# Patient Record
Sex: MALE | Race: WHITE | NOT HISPANIC OR LATINO | Employment: OTHER | URBAN - METROPOLITAN AREA
[De-identification: names, ages, dates, MRNs, and addresses within clinical notes are randomized per-mention and may not be internally consistent; named-entity substitution may affect disease eponyms.]

---

## 2017-01-05 ENCOUNTER — OFFICE VISIT (OUTPATIENT)
Dept: INTERNAL MEDICINE | Facility: CLINIC | Age: 82
End: 2017-01-05

## 2017-01-05 VITALS
HEART RATE: 68 BPM | WEIGHT: 143.8 LBS | HEIGHT: 68 IN | SYSTOLIC BLOOD PRESSURE: 120 MMHG | BODY MASS INDEX: 21.79 KG/M2 | DIASTOLIC BLOOD PRESSURE: 60 MMHG

## 2017-01-05 DIAGNOSIS — D69.6 THROMBOCYTOPENIA (HCC): ICD-10-CM

## 2017-01-05 DIAGNOSIS — R32 URINARY INCONTINENCE, UNSPECIFIED TYPE: ICD-10-CM

## 2017-01-05 DIAGNOSIS — I48.19 PERSISTENT ATRIAL FIBRILLATION (HCC): ICD-10-CM

## 2017-01-05 DIAGNOSIS — E03.8 OTHER SPECIFIED HYPOTHYROIDISM: ICD-10-CM

## 2017-01-05 DIAGNOSIS — E55.9 VITAMIN D DEFICIENCY: ICD-10-CM

## 2017-01-05 DIAGNOSIS — M50.90 DISC DISORDER OF CERVICAL REGION: ICD-10-CM

## 2017-01-05 DIAGNOSIS — I10 ESSENTIAL HYPERTENSION: ICD-10-CM

## 2017-01-05 DIAGNOSIS — E53.9 VITAMIN B DEFICIENCY: ICD-10-CM

## 2017-01-05 DIAGNOSIS — M51.36 DISC DEGENERATION, LUMBAR: ICD-10-CM

## 2017-01-05 DIAGNOSIS — E78.49 OTHER HYPERLIPIDEMIA: ICD-10-CM

## 2017-01-05 DIAGNOSIS — I05.0 MITRAL VALVE STENOSIS, UNSPECIFIED ETIOLOGY: ICD-10-CM

## 2017-01-05 DIAGNOSIS — I35.1 AORTIC VALVE INSUFFICIENCY, UNSPECIFIED ETIOLOGY: Primary | ICD-10-CM

## 2017-01-05 PROCEDURE — 99214 OFFICE O/P EST MOD 30 MIN: CPT | Performed by: INTERNAL MEDICINE

## 2017-01-05 RX ORDER — PREDNISONE 10 MG/1
TABLET ORAL SEE ADMIN INSTRUCTIONS
Qty: 1 EACH | Refills: 0 | Status: SHIPPED | OUTPATIENT
Start: 2017-01-05 | End: 2017-02-16

## 2017-01-05 NOTE — PROGRESS NOTES
Patient is a 89 y.o. male who is here for back pain for about 3 weeks.  Chief Complaint   Patient presents with   • Back Pain         HPI:  Here for f/u.  Patient c/o low to mid back pain.  Onset about 2 weeks.  Worst with getting in and out of bed.  No radiation into legs.  Using tylenol with little help.  Deep breathe worsens.  No fever or chills.  No dysuria.      History:    Patient Active Problem List   Diagnosis   • Aortic regurgitation   • Ataxic gait   • Atherosclerotic heart disease of native coronary artery without angina pectoris   • Atrial fibrillation   • Cor pulmonale   • Disc degeneration, lumbar   • Disc disorder of cervical region   • Hyperlipidemia   • Hypertension   • Hypothyroidism   • Mitral regurgitation   • Mitral stenosis   • Sleep apnea   • Supraventricular aortic stenosis   • Urinary incontinence   • Vitamin D deficiency   • Vitamin B deficiency   • Thrombocytopenia   • Stroke   • Malignant neoplasm prostate   • Cerebral infarction       Past Medical History   Diagnosis Date   • Acute frontal sinusitis    • Cardiac disorder    • Hyperlipidemia    • Hypertension    • Malignant neoplasm prostate    • Stroke      Cerebral infarction, unspecified   • Thrombocytopenia    • Thyroid disease    • Vitamin B deficiency        Past Surgical History   Procedure Laterality Date   • Appendectomy     • Cholecystectomy     • Prostate surgery  1990     sec to prostate ca   • Hernia repair       x3       Current Outpatient Prescriptions on File Prior to Visit   Medication Sig   • amiodarone (PACERONE) 200 MG tablet Take 200 mg by mouth daily.   • apixaban (ELIQUIS) 5 MG tablet tablet Take 1 tablet by mouth 2 (two) times a day.   • Ascorbic Acid (VITAMIN C PO) Take 1 tablet by mouth daily.   • atorvastatin (LIPITOR) 40 MG tablet Take 40 mg by mouth every night.   • Cholecalciferol (VITAMIN D-3 PO) Take  by mouth. Take 1-2 tablets QD   • Coenzyme Q10 (CO Q-10) 100 MG capsule Take 1 capsule by mouth daily.   •  Cyanocobalamin (VITAMIN B 12) 100 MCG lozenge Take 1 lozenge by mouth.   • digoxin (LANOXIN) 125 MCG tablet Take 125 mcg by mouth daily.   • diltiazem CD (CARDIZEM CD) 240 MG 24 hr capsule Take  by mouth.   • furosemide (LASIX) 40 MG tablet Take 1 tablet by mouth 2 (Two) Times a Day.   • levothyroxine (SYNTHROID, LEVOTHROID) 125 MCG tablet Take 1 tablet by mouth Daily.   • metoprolol tartrate (LOPRESSOR) 25 MG tablet Take 25 mg by mouth 2 (two) times a day.   • Omega-3 Fatty Acids (FISH OIL) 1000 MG capsule capsule Take 1 capsule by mouth daily.   • tamsulosin (FLOMAX) 0.4 MG capsule 24 hr capsule Take 1 capsule by mouth Daily.     No current facility-administered medications on file prior to visit.        Family History   Problem Relation Age of Onset   • Diabetes Mother    • Cancer Father    • Cancer Brother        Social History     Social History   • Marital status:      Spouse name: N/A   • Number of children: N/A   • Years of education: N/A     Occupational History   • Not on file.     Social History Main Topics   • Smoking status: Former Smoker   • Smokeless tobacco: Not on file      Comment: distant past   • Alcohol use Yes   • Drug use: Not on file   • Sexual activity: Not on file     Other Topics Concern   • Not on file     Social History Narrative         ROS:    Review of Systems   Constitutional: Positive for fatigue. Negative for chills, diaphoresis, fever and unexpected weight change.   HENT: Negative for congestion, ear pain, hearing loss, nosebleeds, postnasal drip, sinus pressure and sore throat.    Eyes: Negative for pain, discharge and itching.   Respiratory: Positive for shortness of breath. Negative for cough, chest tightness and wheezing.    Cardiovascular: Negative for chest pain, palpitations and leg swelling.   Gastrointestinal: Negative for abdominal distention, abdominal pain, blood in stool, constipation, diarrhea, nausea and vomiting.   Endocrine: Negative for polydipsia and  "polyuria.   Genitourinary: Positive for frequency. Negative for difficulty urinating, dysuria and hematuria.   Musculoskeletal: Positive for arthralgias, back pain and gait problem. Negative for joint swelling and myalgias.   Skin: Negative for rash and wound.   Neurological: Negative for dizziness, syncope, weakness and headaches.   Psychiatric/Behavioral: Negative for dysphoric mood and sleep disturbance. The patient is not nervous/anxious.        Visit Vitals   • /60   • Pulse 68   • Ht 68\" (172.7 cm)   • Wt 143 lb 12.8 oz (65.2 kg)   • BMI 21.86 kg/m2       Physical Exam:    Physical Exam   Constitutional: He is oriented to person, place, and time. He appears well-developed and well-nourished.   HENT:   Head: Normocephalic and atraumatic.   Right Ear: External ear normal.   Left Ear: External ear normal.   Mouth/Throat: Oropharynx is clear and moist.   Eyes: Conjunctivae and EOM are normal.   Neck: Normal range of motion. Neck supple.   Cardiovascular: Normal rate and regular rhythm.    Murmur (2/6 braydon) heard.  Pulmonary/Chest: Effort normal.   Bibasilar rales   Abdominal: Soft. Bowel sounds are normal.   Musculoskeletal: He exhibits edema (trace).   Using rolling walker  Motor in UE 3-4/5  Motor in LE  3-4/5   Lymphadenopathy:     He has no cervical adenopathy.   Neurological: He is alert and oriented to person, place, and time.   Skin: Skin is warm and dry.   Right cheek AK times one   Psychiatric: He has a normal mood and affect. His behavior is normal. Thought content normal.       Procedure:      Discussion/Summary:  htn/AS/AR/CAD-cont current tx  afib-cont eliquis and rate control  edema-cont lasix but decrease to qd and to extra 20 mgi if wt goes up by 4 pounds  hypothyroid-cont replacement, recheck tsh on rtc  fatigue-labs today on rtc  hyperlipidemia-cont lipitor  high risk meds-labs today on rtc  gait insability-PT continuation  cva-cont eliqius due to PAF  Back pain-steroid pac      labs noted " and dw patient      Current Outpatient Prescriptions:   •  amiodarone (PACERONE) 200 MG tablet, Take 200 mg by mouth daily., Disp: , Rfl:   •  apixaban (ELIQUIS) 5 MG tablet tablet, Take 1 tablet by mouth 2 (two) times a day., Disp: , Rfl:   •  Ascorbic Acid (VITAMIN C PO), Take 1 tablet by mouth daily., Disp: , Rfl:   •  atorvastatin (LIPITOR) 40 MG tablet, Take 40 mg by mouth every night., Disp: , Rfl:   •  Cholecalciferol (VITAMIN D-3 PO), Take  by mouth. Take 1-2 tablets QD, Disp: , Rfl:   •  Coenzyme Q10 (CO Q-10) 100 MG capsule, Take 1 capsule by mouth daily., Disp: , Rfl:   •  Cyanocobalamin (VITAMIN B 12) 100 MCG lozenge, Take 1 lozenge by mouth., Disp: , Rfl:   •  digoxin (LANOXIN) 125 MCG tablet, Take 125 mcg by mouth daily., Disp: , Rfl:   •  diltiazem CD (CARDIZEM CD) 240 MG 24 hr capsule, Take  by mouth., Disp: , Rfl:   •  furosemide (LASIX) 40 MG tablet, Take 1 tablet by mouth 2 (Two) Times a Day., Disp: 180 tablet, Rfl: 2  •  levothyroxine (SYNTHROID, LEVOTHROID) 125 MCG tablet, Take 1 tablet by mouth Daily., Disp: 90 tablet, Rfl: 1  •  metoprolol tartrate (LOPRESSOR) 25 MG tablet, Take 25 mg by mouth 2 (two) times a day., Disp: , Rfl:   •  Omega-3 Fatty Acids (FISH OIL) 1000 MG capsule capsule, Take 1 capsule by mouth daily., Disp: , Rfl:   •  tamsulosin (FLOMAX) 0.4 MG capsule 24 hr capsule, Take 1 capsule by mouth Daily., Disp: 90 capsule, Rfl: 2  •  PredniSONE (DELTASONE) 10 MG (48) tablet pack, Take  by mouth See Admin Instructions., Disp: 1 each, Rfl: 0        Ephraim was seen today for back pain.    Diagnoses and all orders for this visit:    Aortic valve insufficiency, unspecified etiology    Persistent atrial fibrillation    Essential hypertension    Mitral valve stenosis, unspecified etiology    Vitamin B deficiency    Vitamin D deficiency    Other specified hypothyroidism    Disc degeneration, lumbar  -     PredniSONE (DELTASONE) 10 MG (48) tablet pack; Take  by mouth See Admin  Instructions.    Disc disorder of cervical region  -     PredniSONE (DELTASONE) 10 MG (48) tablet pack; Take  by mouth See Admin Instructions.    Thrombocytopenia    Other hyperlipidemia    Urinary incontinence, unspecified type

## 2017-02-16 ENCOUNTER — OFFICE VISIT (OUTPATIENT)
Dept: INTERNAL MEDICINE | Facility: CLINIC | Age: 82
End: 2017-02-16

## 2017-02-16 VITALS
DIASTOLIC BLOOD PRESSURE: 50 MMHG | BODY MASS INDEX: 21.52 KG/M2 | HEART RATE: 68 BPM | HEIGHT: 68 IN | SYSTOLIC BLOOD PRESSURE: 115 MMHG | WEIGHT: 142 LBS

## 2017-02-16 DIAGNOSIS — I27.81 COR PULMONALE (HCC): ICD-10-CM

## 2017-02-16 DIAGNOSIS — E55.9 VITAMIN D DEFICIENCY: ICD-10-CM

## 2017-02-16 DIAGNOSIS — R26.0 ATAXIC GAIT: ICD-10-CM

## 2017-02-16 DIAGNOSIS — M50.90 DISC DISORDER OF CERVICAL REGION: ICD-10-CM

## 2017-02-16 DIAGNOSIS — I48.19 PERSISTENT ATRIAL FIBRILLATION (HCC): ICD-10-CM

## 2017-02-16 DIAGNOSIS — E53.9 VITAMIN B DEFICIENCY: ICD-10-CM

## 2017-02-16 DIAGNOSIS — I05.0 MITRAL VALVE STENOSIS, UNSPECIFIED ETIOLOGY: ICD-10-CM

## 2017-02-16 DIAGNOSIS — E78.49 OTHER HYPERLIPIDEMIA: ICD-10-CM

## 2017-02-16 DIAGNOSIS — E03.8 OTHER SPECIFIED HYPOTHYROIDISM: ICD-10-CM

## 2017-02-16 DIAGNOSIS — I35.1 AORTIC VALVE INSUFFICIENCY, UNSPECIFIED ETIOLOGY: Primary | ICD-10-CM

## 2017-02-16 DIAGNOSIS — D69.6 THROMBOCYTOPENIA (HCC): ICD-10-CM

## 2017-02-16 DIAGNOSIS — I34.0 MITRAL VALVE INSUFFICIENCY, UNSPECIFIED ETIOLOGY: ICD-10-CM

## 2017-02-16 DIAGNOSIS — I25.10 ATHEROSCLEROSIS OF NATIVE CORONARY ARTERY OF NATIVE HEART WITHOUT ANGINA PECTORIS: ICD-10-CM

## 2017-02-16 DIAGNOSIS — M51.36 DISC DEGENERATION, LUMBAR: ICD-10-CM

## 2017-02-16 DIAGNOSIS — I10 ESSENTIAL HYPERTENSION: ICD-10-CM

## 2017-02-16 DIAGNOSIS — R32 URINARY INCONTINENCE, UNSPECIFIED TYPE: ICD-10-CM

## 2017-02-16 LAB — TSH SERPL DL<=0.05 MIU/L-ACNC: 0.61 MIU/ML (ref 0.35–5.35)

## 2017-02-16 PROCEDURE — 36415 COLL VENOUS BLD VENIPUNCTURE: CPT | Performed by: INTERNAL MEDICINE

## 2017-02-16 PROCEDURE — 84443 ASSAY THYROID STIM HORMONE: CPT | Performed by: INTERNAL MEDICINE

## 2017-02-16 PROCEDURE — 99214 OFFICE O/P EST MOD 30 MIN: CPT | Performed by: INTERNAL MEDICINE

## 2017-02-16 NOTE — PROGRESS NOTES
Patient is a 89 y.o. male who is here for a follow up of chronic conditions.  Chief Complaint   Patient presents with   • Hypertension   • Hyperlipidemia         HPI:  Here for f/u.  Back pain is better.  Had Mitral valvuloplasty.  Plans for aortic TVAR.  Breathing is good.  No edema.  Appetite is some better.  Sleep is good.  Energy level is not the best.     History:    Patient Active Problem List   Diagnosis   • Aortic regurgitation   • Ataxic gait   • Atherosclerotic heart disease of native coronary artery without angina pectoris   • Atrial fibrillation   • Cor pulmonale   • Disc degeneration, lumbar   • Disc disorder of cervical region   • Hyperlipidemia   • Hypertension   • Hypothyroidism   • Mitral regurgitation   • Mitral stenosis   • Sleep apnea   • Supraventricular aortic stenosis   • Urinary incontinence   • Vitamin D deficiency   • Vitamin B deficiency   • Thrombocytopenia   • Stroke   • Malignant neoplasm prostate   • Cerebral infarction       Past Medical History   Diagnosis Date   • Acute frontal sinusitis    • Cardiac disorder    • Hyperlipidemia    • Hypertension    • Malignant neoplasm prostate    • Stroke      Cerebral infarction, unspecified   • Thrombocytopenia    • Thyroid disease    • Vitamin B deficiency        Past Surgical History   Procedure Laterality Date   • Appendectomy     • Cholecystectomy     • Prostate surgery  1990     sec to prostate ca   • Hernia repair       x3   • Mitral valvuloplasty  01/2017     [facility]       Current Outpatient Prescriptions on File Prior to Visit   Medication Sig   • amiodarone (PACERONE) 200 MG tablet Take 200 mg by mouth daily.   • apixaban (ELIQUIS) 5 MG tablet tablet Take 1 tablet by mouth 2 (two) times a day.   • Ascorbic Acid (VITAMIN C PO) Take 1 tablet by mouth daily.   • atorvastatin (LIPITOR) 40 MG tablet Take 40 mg by mouth every night.   • Cholecalciferol (VITAMIN D-3 PO) Take  by mouth. Take 1-2 tablets QD   • Coenzyme Q10 (CO Q-10) 100 MG  capsule Take 1 capsule by mouth daily.   • Cyanocobalamin (VITAMIN B 12) 100 MCG lozenge Take 1 lozenge by mouth.   • digoxin (LANOXIN) 125 MCG tablet Take 125 mcg by mouth daily.   • diltiazem CD (CARDIZEM CD) 240 MG 24 hr capsule Take  by mouth.   • furosemide (LASIX) 40 MG tablet Take 1 tablet by mouth 2 (Two) Times a Day.   • levothyroxine (SYNTHROID, LEVOTHROID) 125 MCG tablet Take 1 tablet by mouth Daily.   • metoprolol tartrate (LOPRESSOR) 25 MG tablet Take 25 mg by mouth 2 (two) times a day.   • Omega-3 Fatty Acids (FISH OIL) 1000 MG capsule capsule Take 1 capsule by mouth daily.   • tamsulosin (FLOMAX) 0.4 MG capsule 24 hr capsule Take 1 capsule by mouth Daily.   • [DISCONTINUED] PredniSONE (DELTASONE) 10 MG (48) tablet pack Take  by mouth See Admin Instructions.     No current facility-administered medications on file prior to visit.        Family History   Problem Relation Age of Onset   • Diabetes Mother    • Cancer Father    • Cancer Brother        Social History     Social History   • Marital status:      Spouse name: N/A   • Number of children: N/A   • Years of education: N/A     Occupational History   • Not on file.     Social History Main Topics   • Smoking status: Former Smoker   • Smokeless tobacco: Not on file      Comment: distant past   • Alcohol use Yes   • Drug use: Not on file   • Sexual activity: Not on file     Other Topics Concern   • Not on file     Social History Narrative         ROS:    Review of Systems   Constitutional: Positive for fatigue. Negative for chills, diaphoresis, fever and unexpected weight change.   HENT: Negative for congestion, ear pain, hearing loss, nosebleeds, postnasal drip, sinus pressure and sore throat.    Eyes: Negative for pain, discharge and itching.   Respiratory: Positive for shortness of breath. Negative for cough, chest tightness and wheezing.    Cardiovascular: Negative for chest pain, palpitations and leg swelling.   Gastrointestinal: Negative  "for abdominal distention, abdominal pain, blood in stool, constipation, diarrhea, nausea and vomiting.   Endocrine: Negative for polydipsia and polyuria.   Genitourinary: Positive for frequency. Negative for difficulty urinating, dysuria and hematuria.   Musculoskeletal: Positive for arthralgias and gait problem. Negative for back pain, joint swelling and myalgias.   Skin: Negative for rash and wound.   Neurological: Negative for dizziness, syncope, weakness and headaches.   Psychiatric/Behavioral: Negative for dysphoric mood and sleep disturbance. The patient is not nervous/anxious.        Visit Vitals   • /50   • Pulse 68   • Ht 68\" (172.7 cm)   • Wt 142 lb (64.4 kg)   • BMI 21.59 kg/m2       Physical Exam:    Physical Exam   Constitutional: He is oriented to person, place, and time. He appears well-developed and well-nourished.   HENT:   Head: Normocephalic and atraumatic.   Right Ear: External ear normal.   Left Ear: External ear normal.   Mouth/Throat: Oropharynx is clear and moist.   Eyes: Conjunctivae and EOM are normal.   Neck: Normal range of motion. Neck supple.   Cardiovascular: Normal rate and regular rhythm.    Murmur (2/6 braydon) heard.  Pulmonary/Chest: Effort normal.   Bibasilar rales, but overall improved   Abdominal: Soft. Bowel sounds are normal.   Musculoskeletal: He exhibits edema (trace).   Using rolling walker  Motor in UE 3-4/5  Motor in LE  3-4/5   Lymphadenopathy:     He has no cervical adenopathy.   Neurological: He is alert and oriented to person, place, and time.   Skin: Skin is warm and dry.   Psychiatric: He has a normal mood and affect. His behavior is normal. Thought content normal.       Procedure:      Discussion/Summary:  htn/AS/AR/CAD-cont current tx  afib-cont eliquis and rate control  edema-cont lasix but decrease to qd and to extra 20 mgi if wt goes up by 4 pounds  hypothyroid-cont replacement, recheck tsh today  fatigue-labs noted  hyperlipidemia-cont lipitor  high risk " meds-labs today on rtc  gait insability-PT continuation  cva-cont eliqius due to PAF  Back pain-overall improved      labs noted and dw patient      Current Outpatient Prescriptions:   •  amiodarone (PACERONE) 200 MG tablet, Take 200 mg by mouth daily., Disp: , Rfl:   •  apixaban (ELIQUIS) 5 MG tablet tablet, Take 1 tablet by mouth 2 (two) times a day., Disp: , Rfl:   •  Ascorbic Acid (VITAMIN C PO), Take 1 tablet by mouth daily., Disp: , Rfl:   •  atorvastatin (LIPITOR) 40 MG tablet, Take 40 mg by mouth every night., Disp: , Rfl:   •  Cholecalciferol (VITAMIN D-3 PO), Take  by mouth. Take 1-2 tablets QD, Disp: , Rfl:   •  Coenzyme Q10 (CO Q-10) 100 MG capsule, Take 1 capsule by mouth daily., Disp: , Rfl:   •  Cyanocobalamin (VITAMIN B 12) 100 MCG lozenge, Take 1 lozenge by mouth., Disp: , Rfl:   •  digoxin (LANOXIN) 125 MCG tablet, Take 125 mcg by mouth daily., Disp: , Rfl:   •  diltiazem CD (CARDIZEM CD) 240 MG 24 hr capsule, Take  by mouth., Disp: , Rfl:   •  furosemide (LASIX) 40 MG tablet, Take 1 tablet by mouth 2 (Two) Times a Day., Disp: 180 tablet, Rfl: 2  •  levothyroxine (SYNTHROID, LEVOTHROID) 125 MCG tablet, Take 1 tablet by mouth Daily., Disp: 90 tablet, Rfl: 1  •  metoprolol tartrate (LOPRESSOR) 25 MG tablet, Take 25 mg by mouth 2 (two) times a day., Disp: , Rfl:   •  Omega-3 Fatty Acids (FISH OIL) 1000 MG capsule capsule, Take 1 capsule by mouth daily., Disp: , Rfl:   •  tamsulosin (FLOMAX) 0.4 MG capsule 24 hr capsule, Take 1 capsule by mouth Daily., Disp: 90 capsule, Rfl: 2        Ephraim was seen today for hypertension and hyperlipidemia.    Diagnoses and all orders for this visit:    Aortic valve insufficiency, unspecified etiology    Atherosclerosis of native coronary artery of native heart without angina pectoris    Persistent atrial fibrillation    Cor pulmonale    Other hyperlipidemia    Essential hypertension    Mitral valve insufficiency, unspecified etiology    Mitral valve stenosis,  unspecified etiology    Vitamin B deficiency    Vitamin D deficiency    Other specified hypothyroidism  -     TSH    Disc degeneration, lumbar    Disc disorder of cervical region    Thrombocytopenia    Ataxic gait    Urinary incontinence, unspecified type

## 2017-04-03 DIAGNOSIS — E03.8 OTHER SPECIFIED HYPOTHYROIDISM: ICD-10-CM

## 2017-04-03 RX ORDER — LEVOTHYROXINE SODIUM 0.12 MG/1
125 TABLET ORAL DAILY
Qty: 90 TABLET | Refills: 1 | Status: SHIPPED | OUTPATIENT
Start: 2017-04-03 | End: 2017-05-18

## 2017-05-17 DIAGNOSIS — E03.8 OTHER SPECIFIED HYPOTHYROIDISM: ICD-10-CM

## 2017-05-17 RX ORDER — LEVOTHYROXINE SODIUM 0.12 MG/1
TABLET ORAL
Qty: 90 TABLET | Refills: 0 | Status: SHIPPED | OUTPATIENT
Start: 2017-05-17 | End: 2018-01-05 | Stop reason: SDUPTHER

## 2017-05-18 ENCOUNTER — OFFICE VISIT (OUTPATIENT)
Dept: INTERNAL MEDICINE | Facility: CLINIC | Age: 82
End: 2017-05-18

## 2017-05-18 VITALS
BODY MASS INDEX: 20.61 KG/M2 | DIASTOLIC BLOOD PRESSURE: 40 MMHG | HEART RATE: 68 BPM | WEIGHT: 136 LBS | SYSTOLIC BLOOD PRESSURE: 105 MMHG | HEIGHT: 68 IN

## 2017-05-18 DIAGNOSIS — E55.9 VITAMIN D DEFICIENCY: ICD-10-CM

## 2017-05-18 DIAGNOSIS — E03.8 OTHER SPECIFIED HYPOTHYROIDISM: ICD-10-CM

## 2017-05-18 DIAGNOSIS — I25.10 ATHEROSCLEROSIS OF NATIVE CORONARY ARTERY OF NATIVE HEART WITHOUT ANGINA PECTORIS: Primary | ICD-10-CM

## 2017-05-18 DIAGNOSIS — E78.49 OTHER HYPERLIPIDEMIA: ICD-10-CM

## 2017-05-18 DIAGNOSIS — I48.19 PERSISTENT ATRIAL FIBRILLATION (HCC): ICD-10-CM

## 2017-05-18 DIAGNOSIS — I10 ESSENTIAL HYPERTENSION: ICD-10-CM

## 2017-05-18 DIAGNOSIS — D64.9 ANEMIA, UNSPECIFIED TYPE: ICD-10-CM

## 2017-05-18 DIAGNOSIS — D69.6 THROMBOCYTOPENIA (HCC): ICD-10-CM

## 2017-05-18 DIAGNOSIS — R26.0 ATAXIC GAIT: ICD-10-CM

## 2017-05-18 DIAGNOSIS — E53.9 VITAMIN B DEFICIENCY: ICD-10-CM

## 2017-05-18 LAB
25(OH)D3 SERPL-MCNC: 35.6 NG/ML
ALBUMIN SERPL-MCNC: 3.6 G/DL (ref 3.2–4.8)
ALBUMIN/GLOB SERPL: 1 G/DL (ref 1.5–2.5)
ALP SERPL-CCNC: 118 U/L (ref 25–100)
ALT SERPL W P-5'-P-CCNC: 17 U/L (ref 7–40)
ANION GAP SERPL CALCULATED.3IONS-SCNC: 5 MMOL/L (ref 3–11)
AST SERPL-CCNC: 24 U/L (ref 0–33)
BILIRUB SERPL-MCNC: 0.5 MG/DL (ref 0.3–1.2)
BUN BLD-MCNC: 26 MG/DL (ref 9–23)
BUN/CREAT SERPL: 21.7 (ref 7–25)
CALCIUM SPEC-SCNC: 9.3 MG/DL (ref 8.7–10.4)
CHLORIDE SERPL-SCNC: 102 MMOL/L (ref 99–109)
CO2 SERPL-SCNC: 28 MMOL/L (ref 20–31)
CREAT BLD-MCNC: 1.2 MG/DL (ref 0.6–1.3)
DEPRECATED RDW RBC AUTO: 54.5 FL (ref 37–54)
ERYTHROCYTE [DISTWIDTH] IN BLOOD BY AUTOMATED COUNT: 16 % (ref 11.3–14.5)
GFR SERPL CREATININE-BSD FRML MDRD: 57 ML/MIN/1.73
GLOBULIN UR ELPH-MCNC: 3.6 GM/DL
GLUCOSE BLD-MCNC: 100 MG/DL (ref 70–100)
HCT VFR BLD AUTO: 29.6 % (ref 38.9–50.9)
HGB BLD-MCNC: 8.8 G/DL (ref 13.1–17.5)
MCH RBC QN AUTO: 27.4 PG (ref 27–31)
MCHC RBC AUTO-ENTMCNC: 29.7 G/DL (ref 32–36)
MCV RBC AUTO: 92.2 FL (ref 80–99)
PLATELET # BLD AUTO: 407 10*3/MM3 (ref 150–450)
PMV BLD AUTO: 9.8 FL (ref 6–12)
POTASSIUM BLD-SCNC: 4.1 MMOL/L (ref 3.5–5.5)
PROT SERPL-MCNC: 7.2 G/DL (ref 5.7–8.2)
RBC # BLD AUTO: 3.21 10*6/MM3 (ref 4.2–5.76)
SODIUM BLD-SCNC: 135 MMOL/L (ref 132–146)
TSH SERPL DL<=0.05 MIU/L-ACNC: 0.99 MIU/ML (ref 0.35–5.35)
VIT B12 BLD-MCNC: >2000 PG/ML (ref 211–911)
WBC NRBC COR # BLD: 9.94 10*3/MM3 (ref 3.5–10.8)

## 2017-05-18 PROCEDURE — 82746 ASSAY OF FOLIC ACID SERUM: CPT | Performed by: INTERNAL MEDICINE

## 2017-05-18 PROCEDURE — 84443 ASSAY THYROID STIM HORMONE: CPT | Performed by: INTERNAL MEDICINE

## 2017-05-18 PROCEDURE — 82306 VITAMIN D 25 HYDROXY: CPT | Performed by: INTERNAL MEDICINE

## 2017-05-18 PROCEDURE — 82607 VITAMIN B-12: CPT | Performed by: INTERNAL MEDICINE

## 2017-05-18 PROCEDURE — 99214 OFFICE O/P EST MOD 30 MIN: CPT | Performed by: INTERNAL MEDICINE

## 2017-05-18 PROCEDURE — 80053 COMPREHEN METABOLIC PANEL: CPT | Performed by: INTERNAL MEDICINE

## 2017-05-18 PROCEDURE — 83540 ASSAY OF IRON: CPT | Performed by: INTERNAL MEDICINE

## 2017-05-18 PROCEDURE — 85027 COMPLETE CBC AUTOMATED: CPT | Performed by: INTERNAL MEDICINE

## 2017-05-18 RX ORDER — CLOPIDOGREL BISULFATE 75 MG/1
TABLET ORAL DAILY
COMMUNITY
Start: 2017-05-01 | End: 2017-11-27

## 2017-05-18 RX ORDER — VIT A/VIT C/VIT E/ZINC/COPPER 7160-113
TABLET, DELAYED RELEASE (ENTERIC COATED) ORAL
COMMUNITY

## 2017-05-19 LAB
FOLATE SERPL-MCNC: >24 NG/ML (ref 3.2–20)
IRON 24H UR-MRATE: 21 MCG/DL (ref 50–175)

## 2017-05-19 RX ORDER — FERROUS SULFATE 325(65) MG
325 TABLET ORAL 2 TIMES DAILY
Qty: 60 TABLET | Refills: 2 | Status: SHIPPED | OUTPATIENT
Start: 2017-05-19 | End: 2017-08-14 | Stop reason: SDUPTHER

## 2017-06-01 RX ORDER — FUROSEMIDE 40 MG/1
40 TABLET ORAL 2 TIMES DAILY
Qty: 180 TABLET | Refills: 2 | Status: SHIPPED | OUTPATIENT
Start: 2017-06-01 | End: 2017-06-09

## 2017-06-09 RX ORDER — FUROSEMIDE 20 MG/1
20 TABLET ORAL 2 TIMES DAILY
Qty: 60 TABLET | Refills: 5 | Status: SHIPPED | OUTPATIENT
Start: 2017-06-09 | End: 2017-08-10 | Stop reason: SDUPTHER

## 2017-06-27 ENCOUNTER — OFFICE VISIT (OUTPATIENT)
Dept: INTERNAL MEDICINE | Facility: CLINIC | Age: 82
End: 2017-06-27

## 2017-06-27 VITALS
WEIGHT: 138 LBS | DIASTOLIC BLOOD PRESSURE: 40 MMHG | BODY MASS INDEX: 20.92 KG/M2 | SYSTOLIC BLOOD PRESSURE: 115 MMHG | HEIGHT: 68 IN | HEART RATE: 68 BPM

## 2017-06-27 DIAGNOSIS — I27.81 COR PULMONALE (HCC): ICD-10-CM

## 2017-06-27 DIAGNOSIS — I10 ESSENTIAL HYPERTENSION: ICD-10-CM

## 2017-06-27 DIAGNOSIS — E53.9 VITAMIN B DEFICIENCY: ICD-10-CM

## 2017-06-27 DIAGNOSIS — D64.9 ANEMIA, UNSPECIFIED TYPE: ICD-10-CM

## 2017-06-27 DIAGNOSIS — D69.6 THROMBOCYTOPENIA (HCC): ICD-10-CM

## 2017-06-27 DIAGNOSIS — E78.49 OTHER HYPERLIPIDEMIA: ICD-10-CM

## 2017-06-27 DIAGNOSIS — I48.19 PERSISTENT ATRIAL FIBRILLATION (HCC): ICD-10-CM

## 2017-06-27 DIAGNOSIS — I25.10 ATHEROSCLEROSIS OF NATIVE CORONARY ARTERY OF NATIVE HEART WITHOUT ANGINA PECTORIS: ICD-10-CM

## 2017-06-27 DIAGNOSIS — I35.1 AORTIC VALVE INSUFFICIENCY, UNSPECIFIED ETIOLOGY: Primary | ICD-10-CM

## 2017-06-27 DIAGNOSIS — E03.8 OTHER SPECIFIED HYPOTHYROIDISM: ICD-10-CM

## 2017-06-27 DIAGNOSIS — R26.0 ATAXIC GAIT: ICD-10-CM

## 2017-06-27 DIAGNOSIS — E55.9 VITAMIN D DEFICIENCY: ICD-10-CM

## 2017-06-27 LAB
ANION GAP SERPL CALCULATED.3IONS-SCNC: 8 MMOL/L (ref 3–11)
BUN BLD-MCNC: 25 MG/DL (ref 9–23)
BUN/CREAT SERPL: 20.8 (ref 7–25)
CALCIUM SPEC-SCNC: 9.2 MG/DL (ref 8.7–10.4)
CHLORIDE SERPL-SCNC: 106 MMOL/L (ref 99–109)
CO2 SERPL-SCNC: 25 MMOL/L (ref 20–31)
CREAT BLD-MCNC: 1.2 MG/DL (ref 0.6–1.3)
DEPRECATED RDW RBC AUTO: 70.7 FL (ref 37–54)
ERYTHROCYTE [DISTWIDTH] IN BLOOD BY AUTOMATED COUNT: 20 % (ref 11.3–14.5)
GFR SERPL CREATININE-BSD FRML MDRD: 57 ML/MIN/1.73
GLUCOSE BLD-MCNC: 109 MG/DL (ref 70–100)
HCT VFR BLD AUTO: 29.8 % (ref 38.9–50.9)
HGB BLD-MCNC: 8.9 G/DL (ref 13.1–17.5)
IRON 24H UR-MRATE: 82 MCG/DL (ref 50–175)
MCH RBC QN AUTO: 28.8 PG (ref 27–31)
MCHC RBC AUTO-ENTMCNC: 29.9 G/DL (ref 32–36)
MCV RBC AUTO: 96.4 FL (ref 80–99)
PLATELET # BLD AUTO: 204 10*3/MM3 (ref 150–450)
PMV BLD AUTO: 9.8 FL (ref 6–12)
POTASSIUM BLD-SCNC: 4.2 MMOL/L (ref 3.5–5.5)
RBC # BLD AUTO: 3.09 10*6/MM3 (ref 4.2–5.76)
SODIUM BLD-SCNC: 139 MMOL/L (ref 132–146)
WBC NRBC COR # BLD: 7.4 10*3/MM3 (ref 3.5–10.8)

## 2017-06-27 PROCEDURE — 85027 COMPLETE CBC AUTOMATED: CPT | Performed by: INTERNAL MEDICINE

## 2017-06-27 PROCEDURE — 99214 OFFICE O/P EST MOD 30 MIN: CPT | Performed by: INTERNAL MEDICINE

## 2017-06-27 PROCEDURE — 83540 ASSAY OF IRON: CPT | Performed by: INTERNAL MEDICINE

## 2017-06-27 PROCEDURE — 80048 BASIC METABOLIC PNL TOTAL CA: CPT | Performed by: INTERNAL MEDICINE

## 2017-06-27 PROCEDURE — 36415 COLL VENOUS BLD VENIPUNCTURE: CPT | Performed by: INTERNAL MEDICINE

## 2017-06-27 NOTE — PROGRESS NOTES
Patient is a 89 y.o. male who is here for a follow up of chronic conditions.  Chief Complaint   Patient presents with   • Hypertension   • Hyperlipidemia         HPI:  Here for f/u.  Feels better.  Less fatigued.  Some loose BM with the FE.  No palpitations.  No dizziness or lightheadedness.  Appetite is good.  Sleeping well. Stools are dark.      History:    Patient Active Problem List   Diagnosis   • Aortic regurgitation   • Ataxic gait   • Atherosclerotic heart disease of native coronary artery without angina pectoris   • Atrial fibrillation   • Cor pulmonale   • Disc degeneration, lumbar   • Disc disorder of cervical region   • Hyperlipidemia   • Hypertension   • Hypothyroidism   • Mitral regurgitation   • Mitral stenosis   • Sleep apnea   • Supraventricular aortic stenosis   • Urinary incontinence   • Vitamin D deficiency   • Vitamin B deficiency   • Thrombocytopenia   • Stroke   • Malignant neoplasm prostate   • Cerebral infarction       Past Medical History:   Diagnosis Date   • Acute frontal sinusitis    • Cardiac disorder    • Hyperlipidemia    • Hypertension    • Malignant neoplasm prostate    • Stroke     Cerebral infarction, unspecified   • Thrombocytopenia    • Thyroid disease    • Vitamin B deficiency        Past Surgical History:   Procedure Laterality Date   • AORTIC VALVE REPAIR/REPLACEMENT  03/2017    Percutaneous at [facility]   • APPENDECTOMY     • CHOLECYSTECTOMY     • HERNIA REPAIR      x3   • MITRAL VALVULOPLASTY  01/2017    [facility]   • PROSTATE SURGERY  1990    sec to prostate ca       Current Outpatient Prescriptions on File Prior to Visit   Medication Sig   • apixaban (ELIQUIS) 5 MG tablet tablet Take 1 tablet by mouth 2 (two) times a day.   • atorvastatin (LIPITOR) 40 MG tablet Take 40 mg by mouth every night.   • clopidogrel (PLAVIX) 75 MG tablet Daily.   • Coenzyme Q10 (CO Q-10) 100 MG capsule Take 1 capsule by mouth daily.   • Cyanocobalamin (VITAMIN B 12) 100 MCG lozenge Take 1 lozenge by  mouth.   • ferrous sulfate 325 (65 FE) MG tablet Take 1 tablet by mouth 2 (Two) Times a Day.   • furosemide (LASIX) 20 MG tablet Take 1 tablet by mouth 2 (Two) Times a Day.   • levothyroxine (SYNTHROID, LEVOTHROID) 125 MCG tablet TAKE ONE TABLET BY MOUTH DAILY   • metoprolol tartrate (LOPRESSOR) 25 MG tablet Take 25 mg by mouth 2 (two) times a day.   • Multiple Vitamins-Minerals (ICAPS) tablet Take  by mouth.   • tamsulosin (FLOMAX) 0.4 MG capsule 24 hr capsule Take 1 capsule by mouth Daily.     No current facility-administered medications on file prior to visit.        Family History   Problem Relation Age of Onset   • Diabetes Mother    • Cancer Father    • Cancer Brother        Social History     Social History   • Marital status:      Spouse name: N/A   • Number of children: N/A   • Years of education: N/A     Occupational History   • Not on file.     Social History Main Topics   • Smoking status: Former Smoker   • Smokeless tobacco: Not on file      Comment: distant past   • Alcohol use Yes   • Drug use: Not on file   • Sexual activity: Not on file     Other Topics Concern   • Not on file     Social History Narrative         ROS:    Review of Systems   Constitutional: Negative for chills, diaphoresis, fatigue, fever and unexpected weight change.   HENT: Negative for congestion, ear pain, hearing loss, nosebleeds, postnasal drip, sinus pressure and sore throat.    Eyes: Negative for pain, discharge and itching.   Respiratory: Positive for shortness of breath. Negative for cough, chest tightness and wheezing.    Cardiovascular: Negative for chest pain, palpitations and leg swelling.   Gastrointestinal: Negative for abdominal distention, abdominal pain, blood in stool, constipation, diarrhea, nausea and vomiting.   Endocrine: Negative for polydipsia and polyuria.   Genitourinary: Positive for frequency. Negative for difficulty urinating, dysuria and hematuria.   Musculoskeletal: Positive for arthralgias and  "gait problem. Negative for back pain, joint swelling and myalgias.   Skin: Negative for rash and wound.   Neurological: Negative for dizziness, syncope, weakness and headaches.   Psychiatric/Behavioral: Negative for dysphoric mood and sleep disturbance. The patient is not nervous/anxious.        /40  Pulse 68  Ht 68\" (172.7 cm)  Wt 138 lb (62.6 kg)  BMI 20.98 kg/m2    Physical Exam:    Physical Exam   Constitutional: He is oriented to person, place, and time. He appears well-developed and well-nourished.   HENT:   Head: Normocephalic and atraumatic.   Right Ear: External ear normal.   Left Ear: External ear normal.   Mouth/Throat: Oropharynx is clear and moist.   Eyes: Conjunctivae and EOM are normal.   Neck: Normal range of motion. Neck supple.   Cardiovascular: Normal rate and regular rhythm.    Murmur (1/6 braydon ) heard.  Pulmonary/Chest: Effort normal and breath sounds normal.   Abdominal: Soft. Bowel sounds are normal.   Musculoskeletal:   Using rolling walker  Motor in UE 3-4/5  Motor in LE  3-4/5   Lymphadenopathy:     He has no cervical adenopathy.   Neurological: He is alert and oriented to person, place, and time.   Skin: Skin is warm and dry.   Psychiatric: He has a normal mood and affect. His behavior is normal. Thought content normal.       Procedure:      Discussion/Summary:  htn/AS/AR/CAD-cont current tx  afib-cont eliquis and rate control  edema-cont lasix but decrease to qd and to extra 20 mgi if wt goes up by 4 pounds  hypothyroid-cont replacement, recheck tsh today  fatigue-labs noted  hyperlipidemia-cont lipitor  high risk meds-labs today on rtc  gait insability-PT continuation  cva-cont eliqius due to PAF  Back pain-overall improved  Anemia-cont FE, will check labs      labs noted and dw patient, advised citrucel qd      Current Outpatient Prescriptions:   •  apixaban (ELIQUIS) 5 MG tablet tablet, Take 1 tablet by mouth 2 (two) times a day., Disp: , Rfl:   •  atorvastatin (LIPITOR) 40 MG " tablet, Take 40 mg by mouth every night., Disp: , Rfl:   •  clopidogrel (PLAVIX) 75 MG tablet, Daily., Disp: , Rfl:   •  Coenzyme Q10 (CO Q-10) 100 MG capsule, Take 1 capsule by mouth daily., Disp: , Rfl:   •  Cyanocobalamin (VITAMIN B 12) 100 MCG lozenge, Take 1 lozenge by mouth., Disp: , Rfl:   •  ferrous sulfate 325 (65 FE) MG tablet, Take 1 tablet by mouth 2 (Two) Times a Day., Disp: 60 tablet, Rfl: 2  •  furosemide (LASIX) 20 MG tablet, Take 1 tablet by mouth 2 (Two) Times a Day., Disp: 60 tablet, Rfl: 5  •  levothyroxine (SYNTHROID, LEVOTHROID) 125 MCG tablet, TAKE ONE TABLET BY MOUTH DAILY, Disp: 90 tablet, Rfl: 0  •  metoprolol tartrate (LOPRESSOR) 25 MG tablet, Take 25 mg by mouth 2 (two) times a day., Disp: , Rfl:   •  Multiple Vitamins-Minerals (ICAPS) tablet, Take  by mouth., Disp: , Rfl:   •  tamsulosin (FLOMAX) 0.4 MG capsule 24 hr capsule, Take 1 capsule by mouth Daily., Disp: 90 capsule, Rfl: 2        Ephraim was seen today for hypertension and hyperlipidemia.    Diagnoses and all orders for this visit:    Aortic valve insufficiency, unspecified etiology    Atherosclerosis of native coronary artery of native heart without angina pectoris    Persistent atrial fibrillation    Cor pulmonale    Other hyperlipidemia    Essential hypertension  -     Basic Metabolic Panel    Vitamin B deficiency    Vitamin D deficiency    Other specified hypothyroidism    Thrombocytopenia    Ataxic gait    Anemia, unspecified type  -     CBC (No Diff)  -     Iron

## 2017-06-28 RX ORDER — CLOPIDOGREL BISULFATE 75 MG/1
TABLET ORAL
Qty: 30 TABLET | Refills: 3 | OUTPATIENT
Start: 2017-06-28

## 2017-08-02 DIAGNOSIS — E03.8 OTHER SPECIFIED HYPOTHYROIDISM: ICD-10-CM

## 2017-08-02 RX ORDER — LEVOTHYROXINE SODIUM 0.12 MG/1
TABLET ORAL
Qty: 90 TABLET | Refills: 1 | Status: SHIPPED | OUTPATIENT
Start: 2017-08-02 | End: 2017-11-27 | Stop reason: SDUPTHER

## 2017-08-02 RX ORDER — TAMSULOSIN HYDROCHLORIDE 0.4 MG/1
CAPSULE ORAL
Qty: 90 CAPSULE | Refills: 1 | Status: SHIPPED | OUTPATIENT
Start: 2017-08-02 | End: 2017-09-28 | Stop reason: SDUPTHER

## 2017-08-07 RX ORDER — CLOPIDOGREL BISULFATE 75 MG/1
TABLET ORAL
Qty: 30 TABLET | Refills: 11 | OUTPATIENT
Start: 2017-08-07

## 2017-08-10 ENCOUNTER — TELEPHONE (OUTPATIENT)
Dept: INTERNAL MEDICINE | Facility: CLINIC | Age: 82
End: 2017-08-10

## 2017-08-10 RX ORDER — FUROSEMIDE 20 MG/1
TABLET ORAL
Qty: 30 TABLET | Refills: 5 | Status: SHIPPED | OUTPATIENT
Start: 2017-08-10

## 2017-08-14 RX ORDER — FERROUS SULFATE 325(65) MG
325 TABLET ORAL 2 TIMES DAILY
Qty: 60 TABLET | Refills: 2 | Status: SHIPPED | OUTPATIENT
Start: 2017-08-14 | End: 2017-08-24 | Stop reason: SDUPTHER

## 2017-08-24 RX ORDER — FERROUS SULFATE 325(65) MG
325 TABLET ORAL 2 TIMES DAILY
Qty: 60 TABLET | Refills: 2 | Status: SHIPPED | OUTPATIENT
Start: 2017-08-24 | End: 2018-02-27 | Stop reason: SDUPTHER

## 2017-09-28 ENCOUNTER — OFFICE VISIT (OUTPATIENT)
Dept: INTERNAL MEDICINE | Facility: CLINIC | Age: 82
End: 2017-09-28

## 2017-09-28 VITALS
SYSTOLIC BLOOD PRESSURE: 108 MMHG | BODY MASS INDEX: 21.52 KG/M2 | DIASTOLIC BLOOD PRESSURE: 50 MMHG | WEIGHT: 142 LBS | HEIGHT: 68 IN | HEART RATE: 60 BPM

## 2017-09-28 DIAGNOSIS — E78.49 OTHER HYPERLIPIDEMIA: ICD-10-CM

## 2017-09-28 DIAGNOSIS — I35.1 AORTIC VALVE INSUFFICIENCY, UNSPECIFIED ETIOLOGY: Primary | ICD-10-CM

## 2017-09-28 DIAGNOSIS — D50.9 IRON DEFICIENCY ANEMIA, UNSPECIFIED IRON DEFICIENCY ANEMIA TYPE: ICD-10-CM

## 2017-09-28 DIAGNOSIS — R26.0 ATAXIC GAIT: ICD-10-CM

## 2017-09-28 DIAGNOSIS — R32 URINARY INCONTINENCE, UNSPECIFIED TYPE: ICD-10-CM

## 2017-09-28 DIAGNOSIS — Q25.3 SUPRAVENTRICULAR AORTIC STENOSIS: ICD-10-CM

## 2017-09-28 DIAGNOSIS — M51.36 DISC DEGENERATION, LUMBAR: ICD-10-CM

## 2017-09-28 DIAGNOSIS — E55.9 VITAMIN D DEFICIENCY: ICD-10-CM

## 2017-09-28 DIAGNOSIS — Z23 NEED FOR IMMUNIZATION AGAINST INFLUENZA: ICD-10-CM

## 2017-09-28 DIAGNOSIS — E53.9 VITAMIN B DEFICIENCY: ICD-10-CM

## 2017-09-28 DIAGNOSIS — N40.1 BENIGN NON-NODULAR PROSTATIC HYPERPLASIA WITH LOWER URINARY TRACT SYMPTOMS: ICD-10-CM

## 2017-09-28 DIAGNOSIS — I10 ESSENTIAL HYPERTENSION: ICD-10-CM

## 2017-09-28 DIAGNOSIS — I25.10 ATHEROSCLEROSIS OF NATIVE CORONARY ARTERY OF NATIVE HEART WITHOUT ANGINA PECTORIS: ICD-10-CM

## 2017-09-28 DIAGNOSIS — I48.19 PERSISTENT ATRIAL FIBRILLATION (HCC): ICD-10-CM

## 2017-09-28 DIAGNOSIS — I34.0 MITRAL VALVE INSUFFICIENCY, UNSPECIFIED ETIOLOGY: ICD-10-CM

## 2017-09-28 DIAGNOSIS — D69.6 THROMBOCYTOPENIA (HCC): ICD-10-CM

## 2017-09-28 LAB
ANION GAP SERPL CALCULATED.3IONS-SCNC: 4 MMOL/L (ref 3–11)
BUN BLD-MCNC: 32 MG/DL (ref 9–23)
BUN/CREAT SERPL: 22.9 (ref 7–25)
CALCIUM SPEC-SCNC: 9.2 MG/DL (ref 8.7–10.4)
CHLORIDE SERPL-SCNC: 107 MMOL/L (ref 99–109)
CO2 SERPL-SCNC: 28 MMOL/L (ref 20–31)
CREAT BLD-MCNC: 1.4 MG/DL (ref 0.6–1.3)
DEPRECATED RDW RBC AUTO: 55.1 FL (ref 37–54)
ERYTHROCYTE [DISTWIDTH] IN BLOOD BY AUTOMATED COUNT: 15.1 % (ref 11.3–14.5)
GFR SERPL CREATININE-BSD FRML MDRD: 48 ML/MIN/1.73
GLUCOSE BLD-MCNC: 119 MG/DL (ref 70–100)
HCT VFR BLD AUTO: 36.5 % (ref 38.9–50.9)
HGB BLD-MCNC: 11.7 G/DL (ref 13.1–17.5)
IRON 24H UR-MRATE: 94 MCG/DL (ref 50–175)
MCH RBC QN AUTO: 32 PG (ref 27–31)
MCHC RBC AUTO-ENTMCNC: 32.1 G/DL (ref 32–36)
MCV RBC AUTO: 99.7 FL (ref 80–99)
PLATELET # BLD AUTO: 121 10*3/MM3 (ref 150–450)
PMV BLD AUTO: 10.6 FL (ref 6–12)
POTASSIUM BLD-SCNC: 4.5 MMOL/L (ref 3.5–5.5)
RBC # BLD AUTO: 3.66 10*6/MM3 (ref 4.2–5.76)
SODIUM BLD-SCNC: 139 MMOL/L (ref 132–146)
TSH SERPL DL<=0.05 MIU/L-ACNC: 4.75 MIU/ML (ref 0.35–5.35)
VIT B12 BLD-MCNC: >2000 PG/ML (ref 211–911)
WBC NRBC COR # BLD: 7.93 10*3/MM3 (ref 3.5–10.8)

## 2017-09-28 PROCEDURE — 83540 ASSAY OF IRON: CPT | Performed by: INTERNAL MEDICINE

## 2017-09-28 PROCEDURE — 90662 IIV NO PRSV INCREASED AG IM: CPT | Performed by: INTERNAL MEDICINE

## 2017-09-28 PROCEDURE — 82607 VITAMIN B-12: CPT | Performed by: INTERNAL MEDICINE

## 2017-09-28 PROCEDURE — 84443 ASSAY THYROID STIM HORMONE: CPT | Performed by: INTERNAL MEDICINE

## 2017-09-28 PROCEDURE — 80048 BASIC METABOLIC PNL TOTAL CA: CPT | Performed by: INTERNAL MEDICINE

## 2017-09-28 PROCEDURE — 99214 OFFICE O/P EST MOD 30 MIN: CPT | Performed by: INTERNAL MEDICINE

## 2017-09-28 PROCEDURE — G0008 ADMIN INFLUENZA VIRUS VAC: HCPCS | Performed by: INTERNAL MEDICINE

## 2017-09-28 PROCEDURE — 85027 COMPLETE CBC AUTOMATED: CPT | Performed by: INTERNAL MEDICINE

## 2017-09-28 RX ORDER — TAMSULOSIN HYDROCHLORIDE 0.4 MG/1
1 CAPSULE ORAL DAILY
Qty: 90 CAPSULE | Refills: 3 | Status: SHIPPED | OUTPATIENT
Start: 2017-09-28 | End: 2017-11-27

## 2017-09-28 NOTE — PROGRESS NOTES
Patient is a 89 y.o. male who is here for a follow up of chronic conditions.  Chief Complaint   Patient presents with   • Hypertension   • Hyperlipidemia         HPI:  Here for f/u. Doing good.  Breathing is not the best.  Some LEA.  Getting PT with some help.  Energy level is ok.  Edema is controlled.  Appetite is ok.  Edema is controlled.  No HA's.      History:    Patient Active Problem List   Diagnosis   • Aortic regurgitation   • Ataxic gait   • Atherosclerotic heart disease of native coronary artery without angina pectoris   • Atrial fibrillation   • Cor pulmonale   • Disc degeneration, lumbar   • Disc disorder of cervical region   • Hyperlipidemia   • Hypertension   • Hypothyroidism   • Mitral regurgitation   • Mitral stenosis   • Sleep apnea   • Supraventricular aortic stenosis   • Urinary incontinence   • Vitamin D deficiency   • Vitamin B deficiency   • Thrombocytopenia   • Stroke   • Malignant neoplasm prostate   • Cerebral infarction       Past Medical History:   Diagnosis Date   • Acute frontal sinusitis    • Cardiac disorder    • Hyperlipidemia    • Hypertension    • Malignant neoplasm prostate    • Stroke     Cerebral infarction, unspecified   • Thrombocytopenia    • Thyroid disease    • Vitamin B deficiency        Past Surgical History:   Procedure Laterality Date   • AORTIC VALVE REPAIR/REPLACEMENT  03/2017    Percutaneous at [facility]   • APPENDECTOMY     • CHOLECYSTECTOMY     • HERNIA REPAIR      x3   • MITRAL VALVULOPLASTY  01/2017    [facility]   • PROSTATE SURGERY  1990    sec to prostate ca       Current Outpatient Prescriptions on File Prior to Visit   Medication Sig   • apixaban (ELIQUIS) 5 MG tablet tablet Take 1 tablet by mouth 2 (two) times a day.   • atorvastatin (LIPITOR) 40 MG tablet Take 40 mg by mouth every night.   • clopidogrel (PLAVIX) 75 MG tablet Daily.   • Coenzyme Q10 (CO Q-10) 100 MG capsule Take 1 capsule by mouth daily.   • Cyanocobalamin (VITAMIN B 12) 100 MCG lozenge Take 1  lozenge by mouth.   • ferrous sulfate 325 (65 FE) MG tablet Take 1 tablet by mouth 2 (Two) Times a Day.   • furosemide (LASIX) 20 MG tablet 1/2 tab po daily   • levothyroxine (SYNTHROID, LEVOTHROID) 125 MCG tablet TAKE ONE TABLET BY MOUTH DAILY   • levothyroxine (SYNTHROID, LEVOTHROID) 125 MCG tablet TAKE 1 TABLET BY MOUTH DAILY   • metoprolol tartrate (LOPRESSOR) 25 MG tablet Take 25 mg by mouth 2 (two) times a day.   • Multiple Vitamins-Minerals (ICAPS) tablet Take  by mouth.   • [DISCONTINUED] tamsulosin (FLOMAX) 0.4 MG capsule 24 hr capsule TAKE 1 CAPSULE BY MOUTH DAILY     No current facility-administered medications on file prior to visit.        Family History   Problem Relation Age of Onset   • Diabetes Mother    • Cancer Father    • Cancer Brother        Social History     Social History   • Marital status:      Spouse name: N/A   • Number of children: N/A   • Years of education: N/A     Occupational History   • Not on file.     Social History Main Topics   • Smoking status: Former Smoker   • Smokeless tobacco: Not on file      Comment: distant past   • Alcohol use Yes   • Drug use: Not on file   • Sexual activity: Not on file     Other Topics Concern   • Not on file     Social History Narrative         ROS:    Review of Systems   Constitutional: Negative for chills, diaphoresis, fatigue, fever and unexpected weight change.   HENT: Negative for congestion, ear pain, hearing loss, nosebleeds, postnasal drip, sinus pressure and sore throat.    Eyes: Negative for pain, discharge and itching.   Respiratory: Positive for shortness of breath. Negative for cough, chest tightness and wheezing.    Cardiovascular: Positive for leg swelling. Negative for chest pain and palpitations.   Gastrointestinal: Negative for abdominal distention, abdominal pain, blood in stool, constipation, diarrhea, nausea and vomiting.   Endocrine: Negative for polydipsia and polyuria.   Genitourinary: Positive for frequency. Negative  "for difficulty urinating, dysuria and hematuria.   Musculoskeletal: Positive for arthralgias and gait problem. Negative for back pain, joint swelling and myalgias.   Skin: Negative for rash and wound.   Neurological: Negative for dizziness, syncope, weakness and headaches.   Psychiatric/Behavioral: Negative for dysphoric mood and sleep disturbance. The patient is not nervous/anxious.        /50  Pulse 60  Ht 68\" (172.7 cm)  Wt 142 lb (64.4 kg)  BMI 21.59 kg/m2    Physical Exam:    Physical Exam   Constitutional: He is oriented to person, place, and time. He appears well-developed and well-nourished.   HENT:   Head: Normocephalic and atraumatic.   Right Ear: External ear normal.   Left Ear: External ear normal.   Mouth/Throat: Oropharynx is clear and moist.   Eyes: Conjunctivae and EOM are normal.   Neck: Normal range of motion. Neck supple.   Cardiovascular: Normal rate and regular rhythm.    Murmur (1/6 braydon ) heard.  Pulmonary/Chest: Effort normal and breath sounds normal.   Abdominal: Soft. Bowel sounds are normal.   Musculoskeletal: He exhibits edema (trace).   Using rolling walker  Motor in UE 3-4/5  Motor in LE  3-4/5   Lymphadenopathy:     He has no cervical adenopathy.   Neurological: He is alert and oriented to person, place, and time.   Skin: Skin is warm and dry.   Psychiatric: He has a normal mood and affect. His behavior is normal. Thought content normal.       Procedure:      Discussion/Summary:  htn/AS/AR/CAD-cont current tx  afib-cont eliquis and rate control  edema-cont lasix but decrease to qd and to extra 20 mgi if wt goes up by 4 pounds  hypothyroid-cont replacement, recheck tsh today  fatigue-labs noted  hyperlipidemia-cont lipitor, labs on rtc  high risk meds-labs today   gait insability-PT continuation  cva-cont eliqius due to PAF  Back pain-overall improved  Anemia-cont FE, will check labs      labs noted and dw patient, advised citrucel qd  Flu shot today      Current Outpatient " Prescriptions:   •  apixaban (ELIQUIS) 5 MG tablet tablet, Take 1 tablet by mouth 2 (two) times a day., Disp: , Rfl:   •  atorvastatin (LIPITOR) 40 MG tablet, Take 40 mg by mouth every night., Disp: , Rfl:   •  clopidogrel (PLAVIX) 75 MG tablet, Daily., Disp: , Rfl:   •  Coenzyme Q10 (CO Q-10) 100 MG capsule, Take 1 capsule by mouth daily., Disp: , Rfl:   •  Cyanocobalamin (VITAMIN B 12) 100 MCG lozenge, Take 1 lozenge by mouth., Disp: , Rfl:   •  ferrous sulfate 325 (65 FE) MG tablet, Take 1 tablet by mouth 2 (Two) Times a Day., Disp: 60 tablet, Rfl: 2  •  furosemide (LASIX) 20 MG tablet, 1/2 tab po daily, Disp: 30 tablet, Rfl: 5  •  levothyroxine (SYNTHROID, LEVOTHROID) 125 MCG tablet, TAKE ONE TABLET BY MOUTH DAILY, Disp: 90 tablet, Rfl: 0  •  levothyroxine (SYNTHROID, LEVOTHROID) 125 MCG tablet, TAKE 1 TABLET BY MOUTH DAILY, Disp: 90 tablet, Rfl: 1  •  metoprolol tartrate (LOPRESSOR) 25 MG tablet, Take 25 mg by mouth 2 (two) times a day., Disp: , Rfl:   •  Multiple Vitamins-Minerals (ICAPS) tablet, Take  by mouth., Disp: , Rfl:   •  tamsulosin (FLOMAX) 0.4 MG capsule 24 hr capsule, Take 1 capsule by mouth Daily., Disp: 90 capsule, Rfl: 3        Ephraim was seen today for hypertension and hyperlipidemia.    Diagnoses and all orders for this visit:    Aortic valve insufficiency, unspecified etiology    Atherosclerosis of native coronary artery of native heart without angina pectoris    Persistent atrial fibrillation    Other hyperlipidemia    Essential hypertension  -     Basic Metabolic Panel  -     CBC (No Diff)  -     TSH    Mitral valve insufficiency, unspecified etiology    Supraventricular aortic stenosis    Vitamin B deficiency    Vitamin D deficiency    Disc degeneration, lumbar    Thrombocytopenia    Ataxic gait    Urinary incontinence, unspecified type    Need for immunization against influenza  -     Flu Vaccine High Dose PF 65YR+    Benign non-nodular prostatic hyperplasia with lower urinary tract  symptoms  -     tamsulosin (FLOMAX) 0.4 MG capsule 24 hr capsule; Take 1 capsule by mouth Daily.    Iron deficiency anemia, unspecified iron deficiency anemia type  -     Iron  -     Vitamin B12

## 2017-11-27 ENCOUNTER — OFFICE VISIT (OUTPATIENT)
Dept: INTERNAL MEDICINE | Facility: CLINIC | Age: 82
End: 2017-11-27

## 2017-11-27 ENCOUNTER — TELEPHONE (OUTPATIENT)
Dept: INTERNAL MEDICINE | Facility: CLINIC | Age: 82
End: 2017-11-27

## 2017-11-27 VITALS
DIASTOLIC BLOOD PRESSURE: 40 MMHG | HEIGHT: 68 IN | HEART RATE: 64 BPM | SYSTOLIC BLOOD PRESSURE: 104 MMHG | BODY MASS INDEX: 20.92 KG/M2 | WEIGHT: 138 LBS | TEMPERATURE: 100 F

## 2017-11-27 DIAGNOSIS — E55.9 VITAMIN D DEFICIENCY: ICD-10-CM

## 2017-11-27 DIAGNOSIS — I48.19 PERSISTENT ATRIAL FIBRILLATION (HCC): ICD-10-CM

## 2017-11-27 DIAGNOSIS — E03.8 OTHER SPECIFIED HYPOTHYROIDISM: ICD-10-CM

## 2017-11-27 DIAGNOSIS — E78.49 OTHER HYPERLIPIDEMIA: ICD-10-CM

## 2017-11-27 DIAGNOSIS — D69.6 THROMBOCYTOPENIA (HCC): ICD-10-CM

## 2017-11-27 DIAGNOSIS — I10 ESSENTIAL HYPERTENSION: ICD-10-CM

## 2017-11-27 DIAGNOSIS — I25.10 ATHEROSCLEROSIS OF NATIVE CORONARY ARTERY OF NATIVE HEART WITHOUT ANGINA PECTORIS: Primary | ICD-10-CM

## 2017-11-27 DIAGNOSIS — J01.10 ACUTE NON-RECURRENT FRONTAL SINUSITIS: ICD-10-CM

## 2017-11-27 DIAGNOSIS — R26.0 ATAXIC GAIT: ICD-10-CM

## 2017-11-27 DIAGNOSIS — E53.9 VITAMIN B DEFICIENCY: ICD-10-CM

## 2017-11-27 PROCEDURE — 99214 OFFICE O/P EST MOD 30 MIN: CPT | Performed by: INTERNAL MEDICINE

## 2017-11-27 RX ORDER — DOXYCYCLINE HYCLATE 100 MG/1
100 CAPSULE ORAL 2 TIMES DAILY
Qty: 20 CAPSULE | Refills: 0 | Status: SHIPPED | OUTPATIENT
Start: 2017-11-27 | End: 2018-01-04

## 2017-11-27 RX ORDER — DIGOXIN 125 MCG
125 TABLET ORAL EVERY OTHER DAY
COMMUNITY
Start: 2017-10-23 | End: 2018-07-20

## 2017-11-27 NOTE — TELEPHONE ENCOUNTER
Patient's son states that he has been sick for about 2 weeks. He is weak and has been coughing a lot. He was wanting to try to get him in today. Please call his son at [phone]

## 2017-11-27 NOTE — PROGRESS NOTES
Patient is a 89 y.o. male who is here for cough and congestion.  Chief Complaint   Patient presents with   • Cough   • Nasal Congestion         HPI:  Here for f/u.  Patient c/o 7-10 days of cough and congestion.  Coughing up yellow mucous.  Has a low grade temp.  Little energy.  Has some head congestion.  No throat irritation.  No abdominal pains.     History:    Patient Active Problem List   Diagnosis   • Aortic regurgitation   • Ataxic gait   • Atherosclerotic heart disease of native coronary artery without angina pectoris   • Atrial fibrillation   • Cor pulmonale   • Disc degeneration, lumbar   • Disc disorder of cervical region   • Hyperlipidemia   • Hypertension   • Hypothyroidism   • Mitral regurgitation   • Mitral stenosis   • Sleep apnea   • Supraventricular aortic stenosis   • Urinary incontinence   • Vitamin D deficiency   • Vitamin B deficiency   • Thrombocytopenia   • Stroke   • Malignant neoplasm prostate   • Cerebral infarction       Past Medical History:   Diagnosis Date   • Acute frontal sinusitis    • Cardiac disorder    • Hyperlipidemia    • Hypertension    • Malignant neoplasm prostate    • Stroke     Cerebral infarction, unspecified   • Thrombocytopenia    • Thyroid disease    • Vitamin B deficiency        Past Surgical History:   Procedure Laterality Date   • AORTIC VALVE REPAIR/REPLACEMENT  03/2017    Percutaneous at [facility]   • APPENDECTOMY     • CHOLECYSTECTOMY     • HERNIA REPAIR      x3   • MITRAL VALVULOPLASTY  01/2017    [facility]   • PROSTATE SURGERY  1990    sec to prostate ca       Current Outpatient Prescriptions on File Prior to Visit   Medication Sig   • apixaban (ELIQUIS) 5 MG tablet tablet Take 1 tablet by mouth 2 (two) times a day.   • atorvastatin (LIPITOR) 40 MG tablet Take 40 mg by mouth every night.   • Coenzyme Q10 (CO Q-10) 100 MG capsule Take 1 capsule by mouth daily.   • Cyanocobalamin (VITAMIN B 12) 100 MCG lozenge Take 1 lozenge by mouth.   • ferrous sulfate 325 (65 FE) MG  tablet Take 1 tablet by mouth 2 (Two) Times a Day.   • furosemide (LASIX) 20 MG tablet 1/2 tab po daily   • levothyroxine (SYNTHROID, LEVOTHROID) 125 MCG tablet TAKE ONE TABLET BY MOUTH DAILY   • metoprolol tartrate (LOPRESSOR) 25 MG tablet Take 25 mg by mouth 2 (two) times a day.   • Multiple Vitamins-Minerals (ICAPS) tablet Take  by mouth.   • [DISCONTINUED] clopidogrel (PLAVIX) 75 MG tablet Daily.   • [DISCONTINUED] levothyroxine (SYNTHROID, LEVOTHROID) 125 MCG tablet TAKE 1 TABLET BY MOUTH DAILY   • [DISCONTINUED] tamsulosin (FLOMAX) 0.4 MG capsule 24 hr capsule Take 1 capsule by mouth Daily.     No current facility-administered medications on file prior to visit.        Family History   Problem Relation Age of Onset   • Diabetes Mother    • Cancer Father    • Cancer Brother        Social History     Social History   • Marital status:      Spouse name: N/A   • Number of children: N/A   • Years of education: N/A     Occupational History   • Not on file.     Social History Main Topics   • Smoking status: Former Smoker   • Smokeless tobacco: Not on file      Comment: distant past   • Alcohol use Yes   • Drug use: Not on file   • Sexual activity: Not on file     Other Topics Concern   • Not on file     Social History Narrative         ROS:    Review of Systems   Constitutional: Negative for chills, diaphoresis, fatigue, fever and unexpected weight change.   HENT: Positive for congestion. Negative for ear pain, hearing loss, nosebleeds, postnasal drip, sinus pressure and sore throat.    Eyes: Negative for pain, discharge and itching.   Respiratory: Positive for cough and shortness of breath. Negative for chest tightness and wheezing.    Cardiovascular: Positive for leg swelling. Negative for chest pain and palpitations.   Gastrointestinal: Negative for abdominal distention, abdominal pain, blood in stool, constipation, diarrhea, nausea and vomiting.   Endocrine: Negative for polydipsia and polyuria.  "  Genitourinary: Positive for frequency. Negative for difficulty urinating, dysuria and hematuria.   Musculoskeletal: Positive for arthralgias and gait problem. Negative for back pain, joint swelling and myalgias.   Skin: Negative for rash and wound.   Neurological: Positive for weakness. Negative for dizziness, syncope and headaches.   Psychiatric/Behavioral: Negative for dysphoric mood and sleep disturbance. The patient is not nervous/anxious.        /40  Pulse 64  Temp 100 °F (37.8 °C) (Temporal Artery )   Ht 68\" (172.7 cm)  Wt 138 lb (62.6 kg)  BMI 20.98 kg/m2    Physical Exam:    Physical Exam   Constitutional: He is oriented to person, place, and time. He appears well-developed and well-nourished.   HENT:   Head: Normocephalic and atraumatic.   Right Ear: External ear normal.   Left Ear: External ear normal.   Mouth/Throat: Oropharynx is clear and moist.   Sinus tenderness   Eyes: Conjunctivae and EOM are normal.   Neck: Normal range of motion. Neck supple.   Cardiovascular: Normal rate and regular rhythm.    Murmur (1/6 braydon ) heard.  Pulmonary/Chest: Effort normal and breath sounds normal.   Abdominal: Soft. Bowel sounds are normal.   Musculoskeletal: He exhibits edema (trace).   Using rolling walker  Motor in UE 3-4/5  Motor in LE  3-4/5   Lymphadenopathy:     He has no cervical adenopathy.   Neurological: He is alert and oriented to person, place, and time.   Skin: Skin is warm and dry.   Psychiatric: He has a normal mood and affect. His behavior is normal. Thought content normal.       Procedure:      Discussion/Summary:  htn/AS/AR/CAD-cont current tx  afib-cont eliquis and rate control  edema-cont lasix but decrease to qd and to extra 20 mgi if wt goes up by 4 pounds  hypothyroid-cont replacement  fatigue-labs noted  hyperlipidemia-cont lipitor, labs on rtc  high risk meds-labs noted  gait insability-PT continuation  cva-cont eliqius due to PAF  Back pain-overall improved  Anemia-cont " FE  Sinusitis-mucinex and doxy rx      labs noted and dw patient, advised citrucel qd  Flu shot last visit      Current Outpatient Prescriptions:   •  apixaban (ELIQUIS) 5 MG tablet tablet, Take 1 tablet by mouth 2 (two) times a day., Disp: , Rfl:   •  atorvastatin (LIPITOR) 40 MG tablet, Take 40 mg by mouth every night., Disp: , Rfl:   •  Coenzyme Q10 (CO Q-10) 100 MG capsule, Take 1 capsule by mouth daily., Disp: , Rfl:   •  Cyanocobalamin (VITAMIN B 12) 100 MCG lozenge, Take 1 lozenge by mouth., Disp: , Rfl:   •  digoxin (LANOXIN) 125 MCG tablet, , Disp: , Rfl:   •  ferrous sulfate 325 (65 FE) MG tablet, Take 1 tablet by mouth 2 (Two) Times a Day., Disp: 60 tablet, Rfl: 2  •  furosemide (LASIX) 20 MG tablet, 1/2 tab po daily, Disp: 30 tablet, Rfl: 5  •  levothyroxine (SYNTHROID, LEVOTHROID) 125 MCG tablet, TAKE ONE TABLET BY MOUTH DAILY, Disp: 90 tablet, Rfl: 0  •  metoprolol tartrate (LOPRESSOR) 25 MG tablet, Take 25 mg by mouth 2 (two) times a day., Disp: , Rfl:   •  Multiple Vitamins-Minerals (ICAPS) tablet, Take  by mouth., Disp: , Rfl:   •  doxycycline (VIBRAMYCIN) 100 MG capsule, Take 1 capsule by mouth 2 (Two) Times a Day., Disp: 20 capsule, Rfl: 0        Ephraim was seen today for cough and nasal congestion.    Diagnoses and all orders for this visit:    Atherosclerosis of native coronary artery of native heart without angina pectoris    Persistent atrial fibrillation    Other hyperlipidemia    Essential hypertension    Vitamin B deficiency    Vitamin D deficiency    Other specified hypothyroidism    Thrombocytopenia    Ataxic gait    Acute non-recurrent frontal sinusitis  -     doxycycline (VIBRAMYCIN) 100 MG capsule; Take 1 capsule by mouth 2 (Two) Times a Day.

## 2017-12-15 RX ORDER — DONEPEZIL HYDROCHLORIDE 5 MG/1
5 TABLET, FILM COATED ORAL NIGHTLY
Qty: 30 TABLET | Refills: 5 | Status: SHIPPED | OUTPATIENT
Start: 2017-12-15

## 2018-01-04 ENCOUNTER — OFFICE VISIT (OUTPATIENT)
Dept: INTERNAL MEDICINE | Facility: CLINIC | Age: 83
End: 2018-01-04

## 2018-01-04 VITALS
SYSTOLIC BLOOD PRESSURE: 110 MMHG | WEIGHT: 135 LBS | BODY MASS INDEX: 20.46 KG/M2 | DIASTOLIC BLOOD PRESSURE: 54 MMHG | HEART RATE: 60 BPM | HEIGHT: 68 IN

## 2018-01-04 DIAGNOSIS — E53.9 VITAMIN B DEFICIENCY: ICD-10-CM

## 2018-01-04 DIAGNOSIS — E03.8 OTHER SPECIFIED HYPOTHYROIDISM: ICD-10-CM

## 2018-01-04 DIAGNOSIS — I10 ESSENTIAL HYPERTENSION: ICD-10-CM

## 2018-01-04 DIAGNOSIS — E78.49 OTHER HYPERLIPIDEMIA: ICD-10-CM

## 2018-01-04 DIAGNOSIS — I48.0 PAROXYSMAL ATRIAL FIBRILLATION (HCC): ICD-10-CM

## 2018-01-04 DIAGNOSIS — I25.10 ATHEROSCLEROSIS OF NATIVE CORONARY ARTERY OF NATIVE HEART WITHOUT ANGINA PECTORIS: Primary | ICD-10-CM

## 2018-01-04 DIAGNOSIS — D69.6 THROMBOCYTOPENIA (HCC): ICD-10-CM

## 2018-01-04 LAB
ANION GAP SERPL CALCULATED.3IONS-SCNC: 10 MMOL/L (ref 3–11)
BUN BLD-MCNC: 24 MG/DL (ref 9–23)
BUN/CREAT SERPL: 18.5 (ref 7–25)
CALCIUM SPEC-SCNC: 8.8 MG/DL (ref 8.7–10.4)
CHLORIDE SERPL-SCNC: 97 MMOL/L (ref 99–109)
CO2 SERPL-SCNC: 27 MMOL/L (ref 20–31)
CREAT BLD-MCNC: 1.3 MG/DL (ref 0.6–1.3)
DEPRECATED RDW RBC AUTO: 55.7 FL (ref 37–54)
DIGOXIN SERPL-MCNC: 0.91 NG/ML (ref 0.8–2)
ERYTHROCYTE [DISTWIDTH] IN BLOOD BY AUTOMATED COUNT: 15.5 % (ref 11.3–14.5)
GFR SERPL CREATININE-BSD FRML MDRD: 52 ML/MIN/1.73
GLUCOSE BLD-MCNC: 126 MG/DL (ref 70–100)
HCT VFR BLD AUTO: 33.2 % (ref 38.9–50.9)
HGB BLD-MCNC: 10.4 G/DL (ref 13.1–17.5)
MCH RBC QN AUTO: 30.9 PG (ref 27–31)
MCHC RBC AUTO-ENTMCNC: 31.3 G/DL (ref 32–36)
MCV RBC AUTO: 98.5 FL (ref 80–99)
PLATELET # BLD AUTO: 128 10*3/MM3 (ref 150–450)
PMV BLD AUTO: 10.8 FL (ref 6–12)
POTASSIUM BLD-SCNC: 4.7 MMOL/L (ref 3.5–5.5)
RBC # BLD AUTO: 3.37 10*6/MM3 (ref 4.2–5.76)
SODIUM BLD-SCNC: 134 MMOL/L (ref 132–146)
TSH SERPL DL<=0.05 MIU/L-ACNC: 16.42 MIU/ML (ref 0.35–5.35)
VIT B12 BLD-MCNC: >2000 PG/ML (ref 211–911)
WBC NRBC COR # BLD: 12.4 10*3/MM3 (ref 3.5–10.8)

## 2018-01-04 PROCEDURE — 84443 ASSAY THYROID STIM HORMONE: CPT | Performed by: INTERNAL MEDICINE

## 2018-01-04 PROCEDURE — 99214 OFFICE O/P EST MOD 30 MIN: CPT | Performed by: INTERNAL MEDICINE

## 2018-01-04 PROCEDURE — 80162 ASSAY OF DIGOXIN TOTAL: CPT | Performed by: INTERNAL MEDICINE

## 2018-01-04 PROCEDURE — 85027 COMPLETE CBC AUTOMATED: CPT | Performed by: INTERNAL MEDICINE

## 2018-01-04 PROCEDURE — 80048 BASIC METABOLIC PNL TOTAL CA: CPT | Performed by: INTERNAL MEDICINE

## 2018-01-04 PROCEDURE — 82607 VITAMIN B-12: CPT | Performed by: INTERNAL MEDICINE

## 2018-01-04 NOTE — PROGRESS NOTES
Patient is a 89 y.o. male who is here for a follow up of chronic conditions.  Chief Complaint   Patient presents with   • Hypothyroidism         HPI:    Here for f/u. More SOB.  No associated CP.  No increase in edema.  Appetite is not the best.  No abdominal pains.  Gets full easily.  Occasional fluttering.  Sleeping a lot.  No depression.   Had a lot of family and friends in for the holidays.  Little activity.  Did not start aricept yet.   History:    Patient Active Problem List   Diagnosis   • Aortic regurgitation   • Ataxic gait   • Atherosclerotic heart disease of native coronary artery without angina pectoris   • Atrial fibrillation   • Cor pulmonale   • Disc degeneration, lumbar   • Disc disorder of cervical region   • Hyperlipidemia   • Hypertension   • Hypothyroidism   • Mitral regurgitation   • Mitral stenosis   • Sleep apnea   • Supraventricular aortic stenosis   • Urinary incontinence   • Vitamin D deficiency   • Vitamin B deficiency   • Thrombocytopenia   • Stroke   • Malignant neoplasm prostate   • Cerebral infarction       Past Medical History:   Diagnosis Date   • Acute frontal sinusitis    • Cardiac disorder    • Hyperlipidemia    • Hypertension    • Malignant neoplasm prostate    • Stroke     Cerebral infarction, unspecified   • Thrombocytopenia    • Thyroid disease    • Vitamin B deficiency        Past Surgical History:   Procedure Laterality Date   • AORTIC VALVE REPAIR/REPLACEMENT  03/2017    Percutaneous at [facility]   • APPENDECTOMY     • CHOLECYSTECTOMY     • HERNIA REPAIR      x3   • MITRAL VALVULOPLASTY  01/2017    [facility]   • PROSTATE SURGERY  1990    sec to prostate ca       Current Outpatient Prescriptions on File Prior to Visit   Medication Sig   • apixaban (ELIQUIS) 5 MG tablet tablet Take 1 tablet by mouth 2 (two) times a day.   • atorvastatin (LIPITOR) 40 MG tablet Take 40 mg by mouth every night.   • Coenzyme Q10 (CO Q-10) 100 MG capsule Take 1 capsule by mouth daily.   • Cyanocobalamin  (VITAMIN B 12) 100 MCG lozenge Take 1 lozenge by mouth.   • digoxin (LANOXIN) 125 MCG tablet    • donepezil (ARICEPT) 5 MG tablet Take 1 tablet by mouth Every Night.   • ferrous sulfate 325 (65 FE) MG tablet Take 1 tablet by mouth 2 (Two) Times a Day.   • furosemide (LASIX) 20 MG tablet 1/2 tab po daily   • metoprolol tartrate (LOPRESSOR) 25 MG tablet Take 25 mg by mouth 2 (two) times a day.   • Multiple Vitamins-Minerals (ICAPS) tablet Take  by mouth.   • [DISCONTINUED] levothyroxine (SYNTHROID, LEVOTHROID) 125 MCG tablet TAKE ONE TABLET BY MOUTH DAILY     No current facility-administered medications on file prior to visit.        Family History   Problem Relation Age of Onset   • Diabetes Mother    • Cancer Father    • Cancer Brother        Social History     Social History   • Marital status:      Spouse name: N/A   • Number of children: N/A   • Years of education: N/A     Occupational History   • Not on file.     Social History Main Topics   • Smoking status: Former Smoker   • Smokeless tobacco: Not on file      Comment: distant past   • Alcohol use Yes   • Drug use: Not on file   • Sexual activity: Not on file     Other Topics Concern   • Not on file     Social History Narrative         ROS:    Review of Systems   Constitutional: Negative for chills, diaphoresis, fatigue, fever and unexpected weight change.   HENT: Negative for congestion, ear pain, hearing loss, nosebleeds, postnasal drip, sinus pressure and sore throat.    Eyes: Negative for pain, discharge and itching.   Respiratory: Positive for shortness of breath. Negative for cough, chest tightness and wheezing.    Cardiovascular: Negative for chest pain and palpitations.   Gastrointestinal: Negative for abdominal distention, abdominal pain, blood in stool, constipation, diarrhea, nausea and vomiting.   Endocrine: Negative for polydipsia and polyuria.   Genitourinary: Positive for frequency. Negative for difficulty urinating, dysuria and  "hematuria.   Musculoskeletal: Positive for arthralgias and gait problem. Negative for back pain, joint swelling and myalgias.   Skin: Negative for rash and wound.   Neurological: Positive for weakness. Negative for dizziness, syncope and headaches.   Psychiatric/Behavioral: Positive for decreased concentration. Negative for dysphoric mood and sleep disturbance. The patient is not nervous/anxious.        /54 (BP Location: Left arm, Patient Position: Sitting)  Pulse 60  Ht 172.7 cm (67.99\")  Wt 61.2 kg (135 lb)  BMI 20.53 kg/m2    Physical Exam:    Physical Exam   Constitutional: He is oriented to person, place, and time. He appears well-developed and well-nourished.   HENT:   Head: Normocephalic and atraumatic.   Right Ear: External ear normal.   Left Ear: External ear normal.   Mouth/Throat: Oropharynx is clear and moist.   Eyes: Conjunctivae and EOM are normal.   Neck: Normal range of motion. Neck supple.   Cardiovascular: Normal rate and regular rhythm.    Murmur (1/6 braydon ) heard.  Pulmonary/Chest: Effort normal and breath sounds normal.   Abdominal: Soft. Bowel sounds are normal.   Musculoskeletal: He exhibits edema (trace ).   Using rolling walker  Motor in UE 3-4/5  Motor in LE  3-4/5   Lymphadenopathy:     He has no cervical adenopathy.   Neurological: He is alert and oriented to person, place, and time.   Skin: Skin is warm and dry.   Psychiatric: He has a normal mood and affect. His behavior is normal. Thought content normal.       Procedure:      Discussion/Summary:    htn/AS/AR/CAD-cont current tx  afib-cont eliquis and rate control  edema-cont lasix but decrease to qd and to extra 20 mgi if wt goes up by 4 pounds  hypothyroid-cont replacement  fatigue-labs noted  hyperlipidemia-cont lipitor, labs on rtc  high risk meds-labs noted  gait insability-PT continuation  cva-cont eliqius due to PAF  Back pain-overall improved  Anemia-cont FE  High risk meds-labs today      labs noted and dw patient, " advised citrucel qd, and will increase synthroid to 137 mcg  Flu shot last visit    Current Outpatient Prescriptions:   •  apixaban (ELIQUIS) 5 MG tablet tablet, Take 1 tablet by mouth 2 (two) times a day., Disp: , Rfl:   •  atorvastatin (LIPITOR) 40 MG tablet, Take 40 mg by mouth every night., Disp: , Rfl:   •  Coenzyme Q10 (CO Q-10) 100 MG capsule, Take 1 capsule by mouth daily., Disp: , Rfl:   •  Cyanocobalamin (VITAMIN B 12) 100 MCG lozenge, Take 1 lozenge by mouth., Disp: , Rfl:   •  digoxin (LANOXIN) 125 MCG tablet, , Disp: , Rfl:   •  donepezil (ARICEPT) 5 MG tablet, Take 1 tablet by mouth Every Night., Disp: 30 tablet, Rfl: 5  •  ferrous sulfate 325 (65 FE) MG tablet, Take 1 tablet by mouth 2 (Two) Times a Day., Disp: 60 tablet, Rfl: 2  •  furosemide (LASIX) 20 MG tablet, 1/2 tab po daily, Disp: 30 tablet, Rfl: 5  •  levothyroxine (SYNTHROID, LEVOTHROID) 137 MCG tablet, Take 1 tablet by mouth Daily., Disp: 90 tablet, Rfl: 1  •  metoprolol tartrate (LOPRESSOR) 25 MG tablet, Take 25 mg by mouth 2 (two) times a day., Disp: , Rfl:   •  Multiple Vitamins-Minerals (ICAPS) tablet, Take  by mouth., Disp: , Rfl:         Ephraim was seen today for hypothyroidism.    Diagnoses and all orders for this visit:    Atherosclerosis of native coronary artery of native heart without angina pectoris  -     CBC (No Diff)    Paroxysmal atrial fibrillation  -     Digoxin Level    Other hyperlipidemia    Essential hypertension  -     Basic Metabolic Panel    Vitamin B deficiency  -     Vitamin B12    Other specified hypothyroidism  -     TSH  -     levothyroxine (SYNTHROID, LEVOTHROID) 137 MCG tablet; Take 1 tablet by mouth Daily.    Thrombocytopenia

## 2018-01-05 RX ORDER — LEVOTHYROXINE SODIUM 137 UG/1
137 TABLET ORAL DAILY
Qty: 90 TABLET | Refills: 1 | Status: SHIPPED | OUTPATIENT
Start: 2018-01-05 | End: 2018-04-25 | Stop reason: SDUPTHER

## 2018-01-08 ENCOUNTER — TELEPHONE (OUTPATIENT)
Dept: INTERNAL MEDICINE | Facility: CLINIC | Age: 83
End: 2018-01-08

## 2018-01-08 NOTE — TELEPHONE ENCOUNTER
DOMENICO FROM [facility] CALLED WANTED TO LET YOU KNOW THAT THE PATIENTS BLOOD PRESSURE IS LOW SITTING IS 98/58 AND STANDING 110/42. PLEASE GIVE DOMENICO A CALL

## 2018-01-11 ENCOUNTER — TELEPHONE (OUTPATIENT)
Dept: INTERNAL MEDICINE | Facility: CLINIC | Age: 83
End: 2018-01-11

## 2018-01-11 NOTE — TELEPHONE ENCOUNTER
April from [facility] needs a frequency confirmation 1 wk 1; 2 wk 5; 1 wk 2 effective 1/7/2018.

## 2018-01-16 ENCOUNTER — TELEPHONE (OUTPATIENT)
Dept: INTERNAL MEDICINE | Facility: CLINIC | Age: 83
End: 2018-01-16

## 2018-01-19 ENCOUNTER — TELEPHONE (OUTPATIENT)
Dept: INTERNAL MEDICINE | Facility: CLINIC | Age: 83
End: 2018-01-19

## 2018-01-21 PROCEDURE — G0180 MD CERTIFICATION HHA PATIENT: HCPCS | Performed by: INTERNAL MEDICINE

## 2018-01-22 ENCOUNTER — OUTSIDE FACILITY SERVICE (OUTPATIENT)
Dept: INTERNAL MEDICINE | Facility: CLINIC | Age: 83
End: 2018-01-22

## 2018-01-23 ENCOUNTER — TELEPHONE (OUTPATIENT)
Dept: INTERNAL MEDICINE | Facility: CLINIC | Age: 83
End: 2018-01-23

## 2018-01-23 NOTE — TELEPHONE ENCOUNTER
Jennie from [place] is calling to advise pt has low diastolic 118/42. He is not dizzy today but was when at rest yesterday.

## 2018-02-15 ENCOUNTER — OFFICE VISIT (OUTPATIENT)
Dept: INTERNAL MEDICINE | Facility: CLINIC | Age: 83
End: 2018-02-15

## 2018-02-15 VITALS
HEART RATE: 60 BPM | SYSTOLIC BLOOD PRESSURE: 110 MMHG | BODY MASS INDEX: 20 KG/M2 | HEIGHT: 68 IN | WEIGHT: 132 LBS | DIASTOLIC BLOOD PRESSURE: 44 MMHG

## 2018-02-15 DIAGNOSIS — R26.0 ATAXIC GAIT: ICD-10-CM

## 2018-02-15 DIAGNOSIS — I10 ESSENTIAL HYPERTENSION: ICD-10-CM

## 2018-02-15 DIAGNOSIS — E53.9 VITAMIN B DEFICIENCY: ICD-10-CM

## 2018-02-15 DIAGNOSIS — G47.39 OTHER SLEEP APNEA: ICD-10-CM

## 2018-02-15 DIAGNOSIS — E03.8 OTHER SPECIFIED HYPOTHYROIDISM: ICD-10-CM

## 2018-02-15 DIAGNOSIS — I25.10 ATHEROSCLEROSIS OF NATIVE CORONARY ARTERY OF NATIVE HEART WITHOUT ANGINA PECTORIS: Primary | ICD-10-CM

## 2018-02-15 DIAGNOSIS — I48.0 PAROXYSMAL ATRIAL FIBRILLATION (HCC): ICD-10-CM

## 2018-02-15 DIAGNOSIS — E55.9 VITAMIN D DEFICIENCY: ICD-10-CM

## 2018-02-15 DIAGNOSIS — D69.6 THROMBOCYTOPENIA (HCC): ICD-10-CM

## 2018-02-15 DIAGNOSIS — E78.49 OTHER HYPERLIPIDEMIA: ICD-10-CM

## 2018-02-15 PROCEDURE — 99214 OFFICE O/P EST MOD 30 MIN: CPT | Performed by: INTERNAL MEDICINE

## 2018-02-15 NOTE — PROGRESS NOTES
Patient is a 89 y.o. male who is here for a follow up of chronic conditions.  Chief Complaint   Patient presents with   • Hypertension   • Hyperlipidemia         HPI:  Here for f/u.  Yesterday was leaning over and developed dizziness.  No increase in baseline edema.  No CP or palpitations.  Appetite is not the best.  No pnd or orthopnea.     History:    Patient Active Problem List   Diagnosis   • Aortic regurgitation   • Ataxic gait   • Atherosclerotic heart disease of native coronary artery without angina pectoris   • Atrial fibrillation   • Cor pulmonale   • Disc degeneration, lumbar   • Disc disorder of cervical region   • Hyperlipidemia   • Hypertension   • Hypothyroidism   • Mitral regurgitation   • Mitral stenosis   • Sleep apnea   • Supraventricular aortic stenosis   • Urinary incontinence   • Vitamin D deficiency   • Vitamin B deficiency   • Thrombocytopenia   • Stroke   • Malignant neoplasm prostate   • Cerebral infarction       Past Medical History:   Diagnosis Date   • Acute frontal sinusitis    • Cardiac disorder    • Hyperlipidemia    • Hypertension    • Malignant neoplasm prostate    • Stroke     Cerebral infarction, unspecified   • Thrombocytopenia    • Thyroid disease    • Vitamin B deficiency        Past Surgical History:   Procedure Laterality Date   • AORTIC VALVE REPAIR/REPLACEMENT  03/2017    Percutaneous at [facility]   • APPENDECTOMY     • CHOLECYSTECTOMY     • HERNIA REPAIR      x3   • MITRAL VALVULOPLASTY  01/2017    [facility]   • PROSTATE SURGERY  1990    sec to prostate ca       Current Outpatient Prescriptions on File Prior to Visit   Medication Sig   • apixaban (ELIQUIS) 5 MG tablet tablet Take 1 tablet by mouth 2 (two) times a day.   • atorvastatin (LIPITOR) 40 MG tablet Take 40 mg by mouth every night.   • Coenzyme Q10 (CO Q-10) 100 MG capsule Take 1 capsule by mouth daily.   • Cyanocobalamin (VITAMIN B 12) 100 MCG lozenge Take 1 lozenge by mouth.   • digoxin (LANOXIN) 125 MCG tablet    •  ferrous sulfate 325 (65 FE) MG tablet Take 1 tablet by mouth 2 (Two) Times a Day.   • furosemide (LASIX) 20 MG tablet 1/2 tab po daily   • levothyroxine (SYNTHROID, LEVOTHROID) 137 MCG tablet Take 1 tablet by mouth Daily.   • metoprolol tartrate (LOPRESSOR) 25 MG tablet Take 25 mg by mouth 2 (two) times a day.   • Multiple Vitamins-Minerals (ICAPS) tablet Take  by mouth.   • donepezil (ARICEPT) 5 MG tablet Take 1 tablet by mouth Every Night.     No current facility-administered medications on file prior to visit.        Family History   Problem Relation Age of Onset   • Diabetes Mother    • Cancer Father    • Cancer Brother        Social History     Social History   • Marital status:      Spouse name: N/A   • Number of children: N/A   • Years of education: N/A     Occupational History   • Not on file.     Social History Main Topics   • Smoking status: Former Smoker   • Smokeless tobacco: Not on file      Comment: distant past   • Alcohol use Yes   • Drug use: Not on file   • Sexual activity: Not on file     Other Topics Concern   • Not on file     Social History Narrative         ROS:    Review of Systems   Constitutional: Negative for chills, diaphoresis, fatigue, fever and unexpected weight change.   HENT: Negative for congestion, ear pain, hearing loss, nosebleeds, postnasal drip, sinus pressure and sore throat.    Eyes: Negative for pain, discharge and itching.   Respiratory: Positive for shortness of breath. Negative for cough, chest tightness and wheezing.    Cardiovascular: Negative for chest pain and palpitations.   Gastrointestinal: Negative for abdominal distention, abdominal pain, blood in stool, constipation, diarrhea, nausea and vomiting.   Endocrine: Negative for polydipsia and polyuria.   Genitourinary: Positive for frequency. Negative for difficulty urinating, dysuria and hematuria.   Musculoskeletal: Positive for arthralgias and gait problem. Negative for back pain, joint swelling and  "myalgias.   Skin: Negative for rash and wound.   Neurological: Positive for weakness. Negative for dizziness, syncope and headaches.   Psychiatric/Behavioral: Positive for decreased concentration. Negative for dysphoric mood and sleep disturbance. The patient is not nervous/anxious.        /44  Pulse 60  Ht 172.7 cm (67.99\")  Wt 59.9 kg (132 lb)  BMI 20.08 kg/m2    Physical Exam:    Physical Exam   Constitutional: He is oriented to person, place, and time. He appears well-developed and well-nourished.   HENT:   Head: Normocephalic and atraumatic.   Right Ear: External ear normal.   Left Ear: External ear normal.   Mouth/Throat: Oropharynx is clear and moist.   Eyes: Conjunctivae and EOM are normal.   Neck: Normal range of motion. Neck supple.   Cardiovascular: Normal rate and regular rhythm.    Murmur (1/6 braydon ) heard.  Pulmonary/Chest: Effort normal and breath sounds normal.   Abdominal: Soft. Bowel sounds are normal.   Musculoskeletal: He exhibits edema (trace ).   Using rolling walker  Motor in UE 3-4/5  Motor in LE  3-4/5   Lymphadenopathy:     He has no cervical adenopathy.   Neurological: He is alert and oriented to person, place, and time.   Skin: Skin is warm and dry.   Psychiatric: He has a normal mood and affect. His behavior is normal. Thought content normal.       Procedure:      Discussion/Summary:    htn/AS/AR/CAD-cont current tx  afib-cont eliquis and rate control  edema-cont lasix but decrease and to extra 20 mgi if wt goes up by 4 pounds  hypothyroid-cont replacement, tsh on rtc  fatigue-labs noted  hyperlipidemia-cont lipitor, labs on rtc  high risk meds-labs noted  gait insability-PT continuation  cva-cont eliqius due to PAF  Back pain-overall improved  Anemia-cont FE  High risk meds-labs noted      labs noted and dw patient  Flu shot already given    Current Outpatient Prescriptions:   •  apixaban (ELIQUIS) 5 MG tablet tablet, Take 1 tablet by mouth 2 (two) times a day., Disp: , Rfl: "   •  atorvastatin (LIPITOR) 40 MG tablet, Take 40 mg by mouth every night., Disp: , Rfl:   •  Coenzyme Q10 (CO Q-10) 100 MG capsule, Take 1 capsule by mouth daily., Disp: , Rfl:   •  Cyanocobalamin (VITAMIN B 12) 100 MCG lozenge, Take 1 lozenge by mouth., Disp: , Rfl:   •  digoxin (LANOXIN) 125 MCG tablet, , Disp: , Rfl:   •  ferrous sulfate 325 (65 FE) MG tablet, Take 1 tablet by mouth 2 (Two) Times a Day., Disp: 60 tablet, Rfl: 2  •  furosemide (LASIX) 20 MG tablet, 1/2 tab po daily, Disp: 30 tablet, Rfl: 5  •  levothyroxine (SYNTHROID, LEVOTHROID) 137 MCG tablet, Take 1 tablet by mouth Daily., Disp: 90 tablet, Rfl: 1  •  metoprolol tartrate (LOPRESSOR) 25 MG tablet, Take 25 mg by mouth 2 (two) times a day., Disp: , Rfl:   •  Multiple Vitamins-Minerals (ICAPS) tablet, Take  by mouth., Disp: , Rfl:   •  donepezil (ARICEPT) 5 MG tablet, Take 1 tablet by mouth Every Night., Disp: 30 tablet, Rfl: 5        Ephraim was seen today for hypertension and hyperlipidemia.    Diagnoses and all orders for this visit:    Atherosclerosis of native coronary artery of native heart without angina pectoris    Paroxysmal atrial fibrillation    Other hyperlipidemia    Essential hypertension    Vitamin B deficiency    Vitamin D deficiency    Other specified hypothyroidism    Thrombocytopenia    Ataxic gait    Other sleep apnea

## 2018-02-27 RX ORDER — FERROUS SULFATE 325(65) MG
TABLET ORAL
Qty: 60 TABLET | Refills: 2 | Status: SHIPPED | OUTPATIENT
Start: 2018-02-27

## 2018-03-09 ENCOUNTER — TELEPHONE (OUTPATIENT)
Dept: INTERNAL MEDICINE | Facility: CLINIC | Age: 83
End: 2018-03-09

## 2018-03-16 ENCOUNTER — TELEPHONE (OUTPATIENT)
Dept: INTERNAL MEDICINE | Facility: CLINIC | Age: 83
End: 2018-03-16

## 2018-03-16 NOTE — TELEPHONE ENCOUNTER
PT IS HAVING LOOSE BOWELS, SON BELIEVES IT IS THE ARICEPT THAT IS DOING IT. PT SON WANTS YO TO GIVE HIM A CALL BACK

## 2018-04-24 ENCOUNTER — OFFICE VISIT (OUTPATIENT)
Dept: INTERNAL MEDICINE | Facility: CLINIC | Age: 83
End: 2018-04-24

## 2018-04-24 VITALS
HEART RATE: 60 BPM | SYSTOLIC BLOOD PRESSURE: 118 MMHG | HEIGHT: 68 IN | BODY MASS INDEX: 20.61 KG/M2 | WEIGHT: 136 LBS | DIASTOLIC BLOOD PRESSURE: 60 MMHG

## 2018-04-24 DIAGNOSIS — D69.6 THROMBOCYTOPENIA (HCC): ICD-10-CM

## 2018-04-24 DIAGNOSIS — I48.0 PAROXYSMAL ATRIAL FIBRILLATION (HCC): ICD-10-CM

## 2018-04-24 DIAGNOSIS — E53.9 VITAMIN B DEFICIENCY: ICD-10-CM

## 2018-04-24 DIAGNOSIS — M50.90 DISC DISORDER OF CERVICAL REGION: ICD-10-CM

## 2018-04-24 DIAGNOSIS — R26.0 ATAXIC GAIT: ICD-10-CM

## 2018-04-24 DIAGNOSIS — E03.8 OTHER SPECIFIED HYPOTHYROIDISM: ICD-10-CM

## 2018-04-24 DIAGNOSIS — E55.9 VITAMIN D DEFICIENCY: ICD-10-CM

## 2018-04-24 DIAGNOSIS — I05.0 MITRAL VALVE STENOSIS, UNSPECIFIED ETIOLOGY: ICD-10-CM

## 2018-04-24 DIAGNOSIS — I25.10 ATHEROSCLEROSIS OF NATIVE CORONARY ARTERY OF NATIVE HEART WITHOUT ANGINA PECTORIS: ICD-10-CM

## 2018-04-24 DIAGNOSIS — I34.0 MITRAL VALVE INSUFFICIENCY, UNSPECIFIED ETIOLOGY: ICD-10-CM

## 2018-04-24 DIAGNOSIS — M51.36 DISC DEGENERATION, LUMBAR: ICD-10-CM

## 2018-04-24 DIAGNOSIS — I10 ESSENTIAL HYPERTENSION: ICD-10-CM

## 2018-04-24 DIAGNOSIS — I35.1 AORTIC VALVE INSUFFICIENCY, ETIOLOGY OF CARDIAC VALVE DISEASE UNSPECIFIED: Primary | ICD-10-CM

## 2018-04-24 DIAGNOSIS — E78.49 OTHER HYPERLIPIDEMIA: ICD-10-CM

## 2018-04-24 PROCEDURE — 99214 OFFICE O/P EST MOD 30 MIN: CPT | Performed by: INTERNAL MEDICINE

## 2018-04-24 NOTE — PROGRESS NOTES
Patient is a 89 y.o. male who is here for a follow up of chronic conditions.  Chief Complaint   Patient presents with   • Hypertension   • Hyperlipidemia         HPI:    Here for f/u.  Past several days has not felt the best.  No palpitations.  No increased in edema.  Having problems with diarrhea.  No recent antibiotics.  Appetite is not the best.  On aricept qoam.    History:    Patient Active Problem List   Diagnosis   • Aortic regurgitation   • Ataxic gait   • Atherosclerotic heart disease of native coronary artery without angina pectoris   • Atrial fibrillation   • Cor pulmonale   • Disc degeneration, lumbar   • Disc disorder of cervical region   • Hyperlipidemia   • Hypertension   • Hypothyroidism   • Mitral regurgitation   • Mitral stenosis   • Sleep apnea   • Supraventricular aortic stenosis   • Urinary incontinence   • Vitamin D deficiency   • Vitamin B deficiency   • Thrombocytopenia   • Stroke   • Malignant neoplasm prostate   • Cerebral infarction       Past Medical History:   Diagnosis Date   • Acute frontal sinusitis    • Cardiac disorder    • Hyperlipidemia    • Hypertension    • Malignant neoplasm prostate    • Stroke     Cerebral infarction, unspecified   • Thrombocytopenia    • Thyroid disease    • Vitamin B deficiency        Past Surgical History:   Procedure Laterality Date   • AORTIC VALVE REPAIR/REPLACEMENT  03/2017    Percutaneous at [facility]   • APPENDECTOMY     • CHOLECYSTECTOMY     • HERNIA REPAIR      x3   • MITRAL VALVULOPLASTY  01/2017    [facility]   • PROSTATE SURGERY  1990    sec to prostate ca       Current Outpatient Prescriptions on File Prior to Visit   Medication Sig   • apixaban (ELIQUIS) 5 MG tablet tablet Take 1 tablet by mouth 2 (two) times a day.   • atorvastatin (LIPITOR) 40 MG tablet Take 40 mg by mouth every night.   • Coenzyme Q10 (CO Q-10) 100 MG capsule Take 1 capsule by mouth daily.   • Cyanocobalamin (VITAMIN B 12) 100 MCG lozenge Take 1 lozenge by mouth.   • digoxin  (LANOXIN) 125 MCG tablet Take 125 mcg by mouth Every Other Day.   • donepezil (ARICEPT) 5 MG tablet Take 1 tablet by mouth Every Night.   • ferrous sulfate 325 (65 FE) MG tablet TAKE 1 TABLET BY MOUTH 2 TIMES A DAY   • furosemide (LASIX) 20 MG tablet 1/2 tab po daily (Patient taking differently: 1/2 tab po every 3rd day)   • levothyroxine (SYNTHROID, LEVOTHROID) 137 MCG tablet Take 1 tablet by mouth Daily.   • metoprolol tartrate (LOPRESSOR) 25 MG tablet Take 25 mg by mouth 2 (two) times a day.   • Multiple Vitamins-Minerals (ICAPS) tablet Take  by mouth.     No current facility-administered medications on file prior to visit.        Family History   Problem Relation Age of Onset   • Diabetes Mother    • Cancer Father    • Cancer Brother        Social History     Social History   • Marital status:      Spouse name: N/A   • Number of children: N/A   • Years of education: N/A     Occupational History   • Not on file.     Social History Main Topics   • Smoking status: Former Smoker   • Smokeless tobacco: Not on file      Comment: distant past   • Alcohol use Yes   • Drug use: Unknown   • Sexual activity: Not on file     Other Topics Concern   • Not on file     Social History Narrative   • No narrative on file         ROS:    Review of Systems   Constitutional: Positive for fatigue. Negative for chills, diaphoresis, fever and unexpected weight change.   HENT: Negative for congestion, ear pain, hearing loss, nosebleeds, postnasal drip, sinus pressure and sore throat.    Eyes: Negative for pain, discharge and itching.   Respiratory: Positive for shortness of breath. Negative for cough, chest tightness and wheezing.    Cardiovascular: Negative for chest pain and palpitations.   Gastrointestinal: Negative for abdominal distention, abdominal pain, blood in stool, constipation, diarrhea, nausea and vomiting.   Endocrine: Negative for polydipsia and polyuria.   Genitourinary: Positive for frequency. Negative for  "difficulty urinating, dysuria and hematuria.   Musculoskeletal: Positive for arthralgias and gait problem. Negative for back pain, joint swelling and myalgias.   Skin: Negative for rash and wound.   Neurological: Positive for weakness. Negative for dizziness, syncope and headaches.   Psychiatric/Behavioral: Positive for decreased concentration. Negative for dysphoric mood and sleep disturbance. The patient is not nervous/anxious.        /60 (BP Location: Left arm, Patient Position: Sitting)   Pulse 60   Ht 172.7 cm (67.99\")   Wt 61.7 kg (136 lb)   BMI 20.68 kg/m²     Physical Exam:    Physical Exam   Constitutional: He is oriented to person, place, and time. He appears well-developed and well-nourished.   HENT:   Head: Normocephalic and atraumatic.   Right Ear: External ear normal.   Left Ear: External ear normal.   Mouth/Throat: Oropharynx is clear and moist.   Eyes: Conjunctivae and EOM are normal.   Neck: Normal range of motion. Neck supple.   Cardiovascular: Normal rate and regular rhythm.    Murmur (1/6 braydon ) heard.  Pulmonary/Chest: Effort normal and breath sounds normal.   Abdominal: Soft. Bowel sounds are normal.   Musculoskeletal: He exhibits edema (trace ).   Using rolling walker  Motor in UE 3-4/5  Motor in LE  3-4/5   Lymphadenopathy:     He has no cervical adenopathy.   Neurological: He is alert and oriented to person, place, and time.   Skin: Skin is warm and dry.   Psychiatric: He has a normal mood and affect. His behavior is normal. Thought content normal.       Procedure:      Discussion/Summary:    htn/AS/AR/CAD-cont current tx  afib-cont eliquis and rate control  edema-cont lasix and to extra 20 mg if wt goes up by 4 pounds  hypothyroid-cont replacement, tsh on rtc  fatigue-labs noted  hyperlipidemia-cont lipitor, labs on rtc  high risk meds-labs noted  gait insability-PT continuation  cva-cont eliqius due to PAF  Back pain-overall improved  Anemia-hold FE  High risk meds-labs on " rtc      labs noted and dw patient, will temp hold the aricept       Current Outpatient Prescriptions:   •  apixaban (ELIQUIS) 5 MG tablet tablet, Take 1 tablet by mouth 2 (two) times a day., Disp: , Rfl:   •  atorvastatin (LIPITOR) 40 MG tablet, Take 40 mg by mouth every night., Disp: , Rfl:   •  Coenzyme Q10 (CO Q-10) 100 MG capsule, Take 1 capsule by mouth daily., Disp: , Rfl:   •  Cyanocobalamin (VITAMIN B 12) 100 MCG lozenge, Take 1 lozenge by mouth., Disp: , Rfl:   •  digoxin (LANOXIN) 125 MCG tablet, Take 125 mcg by mouth Every Other Day., Disp: , Rfl:   •  donepezil (ARICEPT) 5 MG tablet, Take 1 tablet by mouth Every Night., Disp: 30 tablet, Rfl: 5  •  ferrous sulfate 325 (65 FE) MG tablet, TAKE 1 TABLET BY MOUTH 2 TIMES A DAY, Disp: 60 tablet, Rfl: 2  •  furosemide (LASIX) 20 MG tablet, 1/2 tab po daily (Patient taking differently: 1/2 tab po every 3rd day), Disp: 30 tablet, Rfl: 5  •  levothyroxine (SYNTHROID, LEVOTHROID) 137 MCG tablet, Take 1 tablet by mouth Daily., Disp: 90 tablet, Rfl: 1  •  metoprolol tartrate (LOPRESSOR) 25 MG tablet, Take 25 mg by mouth 2 (two) times a day., Disp: , Rfl:   •  Multiple Vitamins-Minerals (ICAPS) tablet, Take  by mouth., Disp: , Rfl:         Ephraim was seen today for hypertension and hyperlipidemia.    Diagnoses and all orders for this visit:    Aortic valve insufficiency, etiology of cardiac valve disease unspecified    Atherosclerosis of native coronary artery of native heart without angina pectoris    Paroxysmal atrial fibrillation    Other hyperlipidemia    Essential hypertension    Mitral valve insufficiency, unspecified etiology    Mitral valve stenosis, unspecified etiology    Vitamin B deficiency    Vitamin D deficiency    Other specified hypothyroidism    Disc degeneration, lumbar    Disc disorder of cervical region    Thrombocytopenia    Ataxic gait

## 2018-04-25 DIAGNOSIS — E03.8 OTHER SPECIFIED HYPOTHYROIDISM: ICD-10-CM

## 2018-04-25 RX ORDER — LEVOTHYROXINE SODIUM 137 UG/1
137 TABLET ORAL DAILY
Qty: 90 TABLET | Refills: 2 | Status: SHIPPED | OUTPATIENT
Start: 2018-04-25

## 2018-05-18 ENCOUNTER — TELEPHONE (OUTPATIENT)
Dept: INTERNAL MEDICINE | Facility: CLINIC | Age: 83
End: 2018-05-18

## 2018-05-22 ENCOUNTER — LAB (OUTPATIENT)
Dept: INTERNAL MEDICINE | Facility: CLINIC | Age: 83
End: 2018-05-22

## 2018-05-22 DIAGNOSIS — E03.8 OTHER SPECIFIED HYPOTHYROIDISM: ICD-10-CM

## 2018-05-22 DIAGNOSIS — I25.10 ATHEROSCLEROSIS OF NATIVE CORONARY ARTERY OF NATIVE HEART WITHOUT ANGINA PECTORIS: ICD-10-CM

## 2018-05-22 DIAGNOSIS — I15.8 OTHER SECONDARY HYPERTENSION: Primary | ICD-10-CM

## 2018-05-22 LAB
ALBUMIN SERPL-MCNC: 3.8 G/DL (ref 3.2–4.8)
ALBUMIN/GLOB SERPL: 1.2 G/DL (ref 1.5–2.5)
ALP SERPL-CCNC: 112 U/L (ref 25–100)
ALT SERPL W P-5'-P-CCNC: 19 U/L (ref 7–40)
ANION GAP SERPL CALCULATED.3IONS-SCNC: 7 MMOL/L (ref 3–11)
AST SERPL-CCNC: 26 U/L (ref 0–33)
BILIRUB SERPL-MCNC: 0.5 MG/DL (ref 0.3–1.2)
BUN BLD-MCNC: 22 MG/DL (ref 9–23)
BUN/CREAT SERPL: 20 (ref 7–25)
CALCIUM SPEC-SCNC: 8.5 MG/DL (ref 8.7–10.4)
CHLORIDE SERPL-SCNC: 103 MMOL/L (ref 99–109)
CO2 SERPL-SCNC: 26 MMOL/L (ref 20–31)
CREAT BLD-MCNC: 1.1 MG/DL (ref 0.6–1.3)
DEPRECATED RDW RBC AUTO: 52.1 FL (ref 37–54)
ERYTHROCYTE [DISTWIDTH] IN BLOOD BY AUTOMATED COUNT: 14.7 % (ref 11.3–14.5)
GFR SERPL CREATININE-BSD FRML MDRD: 63 ML/MIN/1.73
GLOBULIN UR ELPH-MCNC: 3.2 GM/DL
GLUCOSE BLD-MCNC: 88 MG/DL (ref 70–100)
HCT VFR BLD AUTO: 33.8 % (ref 38.9–50.9)
HGB BLD-MCNC: 10.9 G/DL (ref 13.1–17.5)
MCH RBC QN AUTO: 31.4 PG (ref 27–31)
MCHC RBC AUTO-ENTMCNC: 32.2 G/DL (ref 32–36)
MCV RBC AUTO: 97.4 FL (ref 80–99)
PLATELET # BLD AUTO: 171 10*3/MM3 (ref 150–450)
PMV BLD AUTO: 10.3 FL (ref 6–12)
POTASSIUM BLD-SCNC: 4.5 MMOL/L (ref 3.5–5.5)
PROT SERPL-MCNC: 7 G/DL (ref 5.7–8.2)
RBC # BLD AUTO: 3.47 10*6/MM3 (ref 4.2–5.76)
SODIUM BLD-SCNC: 136 MMOL/L (ref 132–146)
TSH SERPL DL<=0.05 MIU/L-ACNC: 0.48 MIU/ML (ref 0.35–5.35)
WBC NRBC COR # BLD: 9.78 10*3/MM3 (ref 3.5–10.8)

## 2018-05-22 PROCEDURE — 84443 ASSAY THYROID STIM HORMONE: CPT | Performed by: INTERNAL MEDICINE

## 2018-05-22 PROCEDURE — 85027 COMPLETE CBC AUTOMATED: CPT | Performed by: INTERNAL MEDICINE

## 2018-05-22 PROCEDURE — 80053 COMPREHEN METABOLIC PANEL: CPT | Performed by: INTERNAL MEDICINE

## 2018-07-20 ENCOUNTER — OFFICE VISIT (OUTPATIENT)
Dept: INTERNAL MEDICINE | Facility: CLINIC | Age: 83
End: 2018-07-20

## 2018-07-20 VITALS
SYSTOLIC BLOOD PRESSURE: 110 MMHG | DIASTOLIC BLOOD PRESSURE: 78 MMHG | HEIGHT: 68 IN | TEMPERATURE: 98 F | HEART RATE: 100 BPM

## 2018-07-20 DIAGNOSIS — I25.10 ATHEROSCLEROSIS OF NATIVE CORONARY ARTERY OF NATIVE HEART WITHOUT ANGINA PECTORIS: ICD-10-CM

## 2018-07-20 DIAGNOSIS — E53.9 VITAMIN B DEFICIENCY: ICD-10-CM

## 2018-07-20 DIAGNOSIS — E78.2 MIXED HYPERLIPIDEMIA: ICD-10-CM

## 2018-07-20 DIAGNOSIS — R26.0 ATAXIC GAIT: ICD-10-CM

## 2018-07-20 DIAGNOSIS — J18.9 COMMUNITY ACQUIRED PNEUMONIA, UNSPECIFIED LATERALITY: ICD-10-CM

## 2018-07-20 DIAGNOSIS — I48.0 PAROXYSMAL ATRIAL FIBRILLATION (HCC): ICD-10-CM

## 2018-07-20 DIAGNOSIS — I15.8 OTHER SECONDARY HYPERTENSION: ICD-10-CM

## 2018-07-20 DIAGNOSIS — E55.9 VITAMIN D DEFICIENCY: ICD-10-CM

## 2018-07-20 DIAGNOSIS — Q25.3 SUPRAVENTRICULAR AORTIC STENOSIS: ICD-10-CM

## 2018-07-20 DIAGNOSIS — R32 URINARY INCONTINENCE, UNSPECIFIED TYPE: ICD-10-CM

## 2018-07-20 DIAGNOSIS — E03.8 OTHER SPECIFIED HYPOTHYROIDISM: ICD-10-CM

## 2018-07-20 DIAGNOSIS — I35.1 AORTIC VALVE INSUFFICIENCY, ETIOLOGY OF CARDIAC VALVE DISEASE UNSPECIFIED: Primary | ICD-10-CM

## 2018-07-20 PROCEDURE — 99214 OFFICE O/P EST MOD 30 MIN: CPT | Performed by: INTERNAL MEDICINE

## 2018-07-20 RX ORDER — DOXYCYCLINE HYCLATE 100 MG
100 TABLET ORAL 2 TIMES DAILY
Qty: 20 TABLET | Refills: 0 | Status: SHIPPED | OUTPATIENT
Start: 2018-07-20

## 2018-07-20 NOTE — PROGRESS NOTES
Patient is a 89 y.o. male who is here for cough and very weak.  Chief Complaint   Patient presents with   • Cough         HPI:    Patient c/o cough for the past 3 days.  No hemoptysis.  Feels more sob than usual.  Some anterior pleuritic pain.  Little appetite.  Some sore throat.  Some chills.  Some cold intolerance.  No increase in baseline edema.   History:    Patient Active Problem List   Diagnosis   • Aortic regurgitation   • Ataxic gait   • Atherosclerotic heart disease of native coronary artery without angina pectoris   • Atrial fibrillation (CMS/HCC)   • Cor pulmonale (CMS/HCC)   • Disc degeneration, lumbar   • Disc disorder of cervical region   • Hyperlipidemia   • Hypertension   • Hypothyroidism   • Mitral regurgitation   • Mitral stenosis   • Sleep apnea   • Supraventricular aortic stenosis   • Urinary incontinence   • Vitamin D deficiency   • Vitamin B deficiency   • Thrombocytopenia (CMS/HCC)   • Stroke (CMS/HCC)   • Malignant neoplasm prostate (CMS/HCC)   • Cerebral infarction (CMS/HCC)       Past Medical History:   Diagnosis Date   • Acute frontal sinusitis    • Cardiac disorder    • Hyperlipidemia    • Hypertension    • Malignant neoplasm prostate (CMS/HCC)    • Stroke (CMS/HCC)     Cerebral infarction, unspecified   • Thrombocytopenia (CMS/HCC)    • Thyroid disease    • Vitamin B deficiency        Past Surgical History:   Procedure Laterality Date   • AORTIC VALVE REPAIR/REPLACEMENT  03/2017    Percutaneous at [facility]   • APPENDECTOMY     • CHOLECYSTECTOMY     • HERNIA REPAIR      x3   • MITRAL VALVULOPLASTY  01/2017    [facility]   • PROSTATE SURGERY  1990    sec to prostate ca       Current Outpatient Prescriptions on File Prior to Visit   Medication Sig   • apixaban (ELIQUIS) 5 MG tablet tablet Take 1 tablet by mouth 2 (two) times a day.   • atorvastatin (LIPITOR) 40 MG tablet Take 40 mg by mouth every night.   • Coenzyme Q10 (CO Q-10) 100 MG capsule Take 1 capsule by mouth daily.   • Cyanocobalamin  (VITAMIN B 12) 100 MCG lozenge Take 1 lozenge by mouth.   • ferrous sulfate 325 (65 FE) MG tablet TAKE 1 TABLET BY MOUTH 2 TIMES A DAY   • furosemide (LASIX) 20 MG tablet 1/2 tab po daily (Patient taking differently: 1/2 tab po every 3rd day)   • levothyroxine (SYNTHROID, LEVOTHROID) 137 MCG tablet TAKE 1 TABLET BY MOUTH DAILY   • metoprolol tartrate (LOPRESSOR) 25 MG tablet Take 25 mg by mouth 2 (two) times a day.   • Multiple Vitamins-Minerals (ICAPS) tablet Take  by mouth.   • donepezil (ARICEPT) 5 MG tablet Take 1 tablet by mouth Every Night.   • [DISCONTINUED] digoxin (LANOXIN) 125 MCG tablet Take 125 mcg by mouth Every Other Day.     No current facility-administered medications on file prior to visit.        Family History   Problem Relation Age of Onset   • Diabetes Mother    • Cancer Father    • Cancer Brother        Social History     Social History   • Marital status:      Spouse name: N/A   • Number of children: N/A   • Years of education: N/A     Occupational History   • Not on file.     Social History Main Topics   • Smoking status: Former Smoker   • Smokeless tobacco: Not on file      Comment: distant past   • Alcohol use Yes   • Drug use: Unknown   • Sexual activity: Not on file     Other Topics Concern   • Not on file     Social History Narrative   • No narrative on file         Review of Systems   Constitutional: Positive for fatigue. Negative for chills, diaphoresis, fever and unexpected weight change.   HENT: Negative for congestion, ear pain, hearing loss, nosebleeds, postnasal drip, sinus pressure and sore throat.    Eyes: Negative for pain, discharge and itching.   Respiratory: Positive for cough and shortness of breath. Negative for chest tightness and wheezing.    Cardiovascular: Negative for chest pain and palpitations.   Gastrointestinal: Negative for abdominal distention, abdominal pain, blood in stool, constipation, diarrhea, nausea and vomiting.   Endocrine: Negative for  "polydipsia and polyuria.   Genitourinary: Positive for frequency. Negative for difficulty urinating, dysuria and hematuria.   Musculoskeletal: Positive for arthralgias and gait problem. Negative for back pain, joint swelling and myalgias.   Skin: Negative for rash and wound.   Neurological: Positive for weakness. Negative for dizziness, syncope and headaches.   Psychiatric/Behavioral: Positive for decreased concentration. Negative for dysphoric mood and sleep disturbance. The patient is not nervous/anxious.        /78   Pulse 100   Temp 98 °F (36.7 °C) (Temporal Artery )   Ht 172.7 cm (67.99\")       Physical Exam   Constitutional: He is oriented to person, place, and time. He appears well-developed and well-nourished.   HENT:   Head: Normocephalic and atraumatic.   Right Ear: External ear normal.   Left Ear: External ear normal.   Mouth/Throat: Oropharynx is clear and moist.   Eyes: Conjunctivae and EOM are normal.   Neck: Normal range of motion. Neck supple.   Cardiovascular: Normal rate and regular rhythm.    Murmur (1/6 braydon ) heard.  Pulmonary/Chest: Effort normal.   Bibasilar rales and mild rhonchi   Abdominal: Soft. Bowel sounds are normal.   Musculoskeletal: He exhibits edema (trace ).   Using rolling walker  Motor in UE 3-4/5  Motor in LE  3-4/5   Lymphadenopathy:     He has no cervical adenopathy.   Neurological: He is alert and oriented to person, place, and time.   Skin: Skin is warm and dry.   Psychiatric: He has a normal mood and affect. His behavior is normal. Thought content normal.       Procedure:      Discussion/Summary:    htn/AS/AR/CAD-cont current tx  afib-cont eliquis and rate control  edema-cont lasix and to extra 20 mg if wt goes up by 4 pounds  hypothyroid-cont replacement, tsh on rtc  fatigue-labs noted  hyperlipidemia-cont lipitor, labs on rtc  high risk meds-labs notedcva-cont eliqius due to PAF  gait insability-PT continuation  cva-cont RF mod  Back pain-overall " improved  Anemia-hold FE  CAP-doxy rx, hospital if not better in 24-36 hours  High risk meds-labs on rtc      labs noted and dw patient    Current Outpatient Prescriptions:   •  apixaban (ELIQUIS) 5 MG tablet tablet, Take 1 tablet by mouth 2 (two) times a day., Disp: , Rfl:   •  atorvastatin (LIPITOR) 40 MG tablet, Take 40 mg by mouth every night., Disp: , Rfl:   •  Coenzyme Q10 (CO Q-10) 100 MG capsule, Take 1 capsule by mouth daily., Disp: , Rfl:   •  Cyanocobalamin (VITAMIN B 12) 100 MCG lozenge, Take 1 lozenge by mouth., Disp: , Rfl:   •  ferrous sulfate 325 (65 FE) MG tablet, TAKE 1 TABLET BY MOUTH 2 TIMES A DAY, Disp: 60 tablet, Rfl: 2  •  furosemide (LASIX) 20 MG tablet, 1/2 tab po daily (Patient taking differently: 1/2 tab po every 3rd day), Disp: 30 tablet, Rfl: 5  •  levothyroxine (SYNTHROID, LEVOTHROID) 137 MCG tablet, TAKE 1 TABLET BY MOUTH DAILY, Disp: 90 tablet, Rfl: 2  •  metoprolol tartrate (LOPRESSOR) 25 MG tablet, Take 25 mg by mouth 2 (two) times a day., Disp: , Rfl:   •  Multiple Vitamins-Minerals (ICAPS) tablet, Take  by mouth., Disp: , Rfl:   •  donepezil (ARICEPT) 5 MG tablet, Take 1 tablet by mouth Every Night., Disp: 30 tablet, Rfl: 5  •  doxycycline (VIBRAMYICN) 100 MG tablet, Take 1 tablet by mouth 2 (Two) Times a Day., Disp: 20 tablet, Rfl: 0        Ephraim was seen today for cough.    Diagnoses and all orders for this visit:    Aortic valve insufficiency, etiology of cardiac valve disease unspecified    Atherosclerosis of native coronary artery of native heart without angina pectoris    Paroxysmal atrial fibrillation (CMS/HCC)    Mixed hyperlipidemia    Other secondary hypertension    Supraventricular aortic stenosis    Vitamin B deficiency    Vitamin D deficiency    Other specified hypothyroidism    Ataxic gait    Urinary incontinence, unspecified type    Community acquired pneumonia, unspecified laterality  -     doxycycline (VIBRAMYICN) 100 MG tablet; Take 1 tablet by mouth 2 (Two)  Times a Day.

## 2018-07-24 PROBLEM — I38 VHD (VALVULAR HEART DISEASE): Status: ACTIVE | Noted: 2018-07-24

## 2018-07-31 ENCOUNTER — TELEPHONE (OUTPATIENT)
Dept: INTERNAL MEDICINE | Facility: CLINIC | Age: 83
End: 2018-07-31

## 2018-07-31 NOTE — TELEPHONE ENCOUNTER
Pt has been moved to skilled nursing at the [facility]. Keith said that dr vazquez can call him if he needs too.

## 2018-10-08 ENCOUNTER — TELEPHONE (OUTPATIENT)
Dept: INTERNAL MEDICINE | Facility: CLINIC | Age: 83
End: 2018-10-08

## 2018-10-11 ENCOUNTER — TELEPHONE (OUTPATIENT)
Dept: INTERNAL MEDICINE | Facility: CLINIC | Age: 83
End: 2018-10-11

## 2018-10-11 NOTE — TELEPHONE ENCOUNTER
LEIGH FROM [place] WANTED TO LET YOU KNOW THAT HIS BP /44 AND PULSE IS 57 AND THE PATIENT IS HAVING DIARRHEA SHE WASN'T FOR SURE IF THE DIARRHEA WAS COMING FROM A SUPPLEMENT HIS DAUGHTER IS GIVING HIM.

## 2018-10-16 ENCOUNTER — TELEPHONE (OUTPATIENT)
Dept: INTERNAL MEDICINE | Facility: CLINIC | Age: 83
End: 2018-10-16

## 2018-10-24 ENCOUNTER — TELEPHONE (OUTPATIENT)
Dept: INTERNAL MEDICINE | Facility: CLINIC | Age: 83
End: 2018-10-24

## 2018-10-31 ENCOUNTER — TELEPHONE (OUTPATIENT)
Dept: INTERNAL MEDICINE | Facility: CLINIC | Age: 83
End: 2018-10-31

## 2018-10-31 NOTE — TELEPHONE ENCOUNTER
FYI    120/52  DIASTOLIC IS OUT OF PARAMETERS   NURSE WANTS TO CHANGE PARAMETER OF DIASTOLIC TO 90

## 2018-11-09 ENCOUNTER — TELEPHONE (OUTPATIENT)
Dept: INTERNAL MEDICINE | Facility: CLINIC | Age: 83
End: 2018-11-09

## 2018-11-09 NOTE — TELEPHONE ENCOUNTER
FYI  PT HAS BEEN HAVING A COUGH FOR THE PAST 3 DAYS  PRODUCTIVE COUGH WITH YELLOW SPUDUM   NEEDS OK FOR PLAN OF CARE

## 2018-11-14 ENCOUNTER — TELEPHONE (OUTPATIENT)
Dept: INTERNAL MEDICINE | Facility: CLINIC | Age: 83
End: 2018-11-14

## 2018-11-14 DIAGNOSIS — E03.8 OTHER SPECIFIED HYPOTHYROIDISM: ICD-10-CM

## 2018-11-14 RX ORDER — TRAZODONE HYDROCHLORIDE 50 MG/1
50 TABLET ORAL NIGHTLY
Qty: 30 TABLET | Refills: 5 | Status: SHIPPED | OUTPATIENT
Start: 2018-11-14

## 2018-11-14 RX ORDER — METOPROLOL TARTRATE 50 MG/1
50 TABLET, FILM COATED ORAL EVERY 12 HOURS SCHEDULED
Qty: 180 TABLET | Refills: 3 | Status: SHIPPED | OUTPATIENT
Start: 2018-11-14

## 2018-11-21 ENCOUNTER — TELEPHONE (OUTPATIENT)
Dept: INTERNAL MEDICINE | Facility: CLINIC | Age: 83
End: 2018-11-21

## 2019-11-12 ENCOUNTER — TELEPHONE (OUTPATIENT)
Dept: INTERNAL MEDICINE | Facility: CLINIC | Age: 84
End: 2019-11-12